# Patient Record
Sex: MALE | URBAN - METROPOLITAN AREA
[De-identification: names, ages, dates, MRNs, and addresses within clinical notes are randomized per-mention and may not be internally consistent; named-entity substitution may affect disease eponyms.]

---

## 2017-06-20 ENCOUNTER — APPOINTMENT (OUTPATIENT)
Dept: OTHER | Facility: HOSPITAL | Age: 67
End: 2017-06-20
Attending: EMERGENCY MEDICINE

## 2024-08-04 ENCOUNTER — HOSPITAL ENCOUNTER (INPATIENT)
Facility: HOSPITAL | Age: 74
LOS: 2 days | Discharge: HOME HEALTH | End: 2024-08-06
Attending: EMERGENCY MEDICINE | Admitting: EMERGENCY MEDICINE
Payer: MEDICARE

## 2024-08-04 ENCOUNTER — APPOINTMENT (OUTPATIENT)
Dept: GENERAL RADIOLOGY | Facility: HOSPITAL | Age: 74
End: 2024-08-04
Payer: MEDICARE

## 2024-08-04 DIAGNOSIS — I21.4 NSTEMI (NON-ST ELEVATED MYOCARDIAL INFARCTION) (HCC): Primary | ICD-10-CM

## 2024-08-04 LAB
ALBUMIN SERPL-MCNC: 4.5 G/DL (ref 3.2–4.8)
ALBUMIN/GLOB SERPL: 2.1 {RATIO} (ref 1–2)
ALP LIVER SERPL-CCNC: 56 U/L
ALT SERPL-CCNC: 17 U/L
ANION GAP SERPL CALC-SCNC: 5 MMOL/L (ref 0–18)
ANION GAP SERPL CALC-SCNC: 7 MMOL/L (ref 0–18)
APTT PPP: 26.8 SECONDS (ref 23–36)
AST SERPL-CCNC: 20 U/L (ref ?–34)
BASOPHILS # BLD AUTO: 0.06 X10(3) UL (ref 0–0.2)
BASOPHILS NFR BLD AUTO: 0.9 %
BILIRUB SERPL-MCNC: 0.7 MG/DL (ref 0.2–1.1)
BUN BLD-MCNC: 12 MG/DL (ref 9–23)
BUN BLD-MCNC: 12 MG/DL (ref 9–23)
BUN/CREAT SERPL: 12.2 (ref 10–20)
BUN/CREAT SERPL: 13.3 (ref 10–20)
CALCIUM BLD-MCNC: 9.7 MG/DL (ref 8.7–10.4)
CALCIUM BLD-MCNC: 9.8 MG/DL (ref 8.7–10.4)
CHLORIDE SERPL-SCNC: 108 MMOL/L (ref 98–112)
CHLORIDE SERPL-SCNC: 112 MMOL/L (ref 98–112)
CHOLEST SERPL-MCNC: 165 MG/DL (ref ?–200)
CO2 SERPL-SCNC: 23 MMOL/L (ref 21–32)
CO2 SERPL-SCNC: 26 MMOL/L (ref 21–32)
CREAT BLD-MCNC: 0.9 MG/DL
CREAT BLD-MCNC: 0.98 MG/DL
DEPRECATED RDW RBC AUTO: 43.2 FL (ref 35.1–46.3)
DEPRECATED RDW RBC AUTO: 44.4 FL (ref 35.1–46.3)
EGFRCR SERPLBLD CKD-EPI 2021: 80 ML/MIN/1.73M2 (ref 60–?)
EGFRCR SERPLBLD CKD-EPI 2021: 89 ML/MIN/1.73M2 (ref 60–?)
EOSINOPHIL # BLD AUTO: 0.13 X10(3) UL (ref 0–0.7)
EOSINOPHIL NFR BLD AUTO: 2 %
ERYTHROCYTE [DISTWIDTH] IN BLOOD BY AUTOMATED COUNT: 13.4 % (ref 11–15)
ERYTHROCYTE [DISTWIDTH] IN BLOOD BY AUTOMATED COUNT: 13.6 % (ref 11–15)
EST. AVERAGE GLUCOSE BLD GHB EST-MCNC: 120 MG/DL (ref 68–126)
GLOBULIN PLAS-MCNC: 2.1 G/DL (ref 2–3.5)
GLUCOSE BLD-MCNC: 145 MG/DL (ref 70–99)
GLUCOSE BLD-MCNC: 172 MG/DL (ref 70–99)
HBA1C MFR BLD: 5.8 % (ref ?–5.7)
HCT VFR BLD AUTO: 44 %
HCT VFR BLD AUTO: 44.8 %
HDLC SERPL-MCNC: 40 MG/DL (ref 40–59)
HGB BLD-MCNC: 15.6 G/DL
HGB BLD-MCNC: 16.1 G/DL
IMM GRANULOCYTES # BLD AUTO: 0.02 X10(3) UL (ref 0–1)
IMM GRANULOCYTES NFR BLD: 0.3 %
LDLC SERPL CALC-MCNC: 82 MG/DL (ref ?–100)
LYMPHOCYTES # BLD AUTO: 1.68 X10(3) UL (ref 1–4)
LYMPHOCYTES NFR BLD AUTO: 25.8 %
MCH RBC QN AUTO: 31.4 PG (ref 26–34)
MCH RBC QN AUTO: 31.9 PG (ref 26–34)
MCHC RBC AUTO-ENTMCNC: 34.8 G/DL (ref 31–37)
MCHC RBC AUTO-ENTMCNC: 36.6 G/DL (ref 31–37)
MCV RBC AUTO: 87.1 FL
MCV RBC AUTO: 90.1 FL
MONOCYTES # BLD AUTO: 0.49 X10(3) UL (ref 0.1–1)
MONOCYTES NFR BLD AUTO: 7.5 %
NEUTROPHILS # BLD AUTO: 4.14 X10 (3) UL (ref 1.5–7.7)
NEUTROPHILS # BLD AUTO: 4.14 X10(3) UL (ref 1.5–7.7)
NEUTROPHILS NFR BLD AUTO: 63.5 %
NONHDLC SERPL-MCNC: 125 MG/DL (ref ?–130)
OSMOLALITY SERPL CALC.SUM OF ELEC: 292 MOSM/KG (ref 275–295)
OSMOLALITY SERPL CALC.SUM OF ELEC: 296 MOSM/KG (ref 275–295)
PLATELET # BLD AUTO: 189 10(3)UL (ref 150–450)
PLATELET # BLD AUTO: 194 10(3)UL (ref 150–450)
POTASSIUM SERPL-SCNC: 4.1 MMOL/L (ref 3.5–5.1)
POTASSIUM SERPL-SCNC: 4.2 MMOL/L (ref 3.5–5.1)
PROT SERPL-MCNC: 6.6 G/DL (ref 5.7–8.2)
RBC # BLD AUTO: 4.97 X10(6)UL
RBC # BLD AUTO: 5.05 X10(6)UL
SODIUM SERPL-SCNC: 139 MMOL/L (ref 136–145)
SODIUM SERPL-SCNC: 142 MMOL/L (ref 136–145)
TRIGL SERPL-MCNC: 261 MG/DL (ref 30–149)
TROPONIN I SERPL HS-MCNC: 186 NG/L
TROPONIN I SERPL HS-MCNC: 70 NG/L
VLDLC SERPL CALC-MCNC: 42 MG/DL (ref 0–30)
WBC # BLD AUTO: 6.5 X10(3) UL (ref 4–11)
WBC # BLD AUTO: 6.8 X10(3) UL (ref 4–11)

## 2024-08-04 PROCEDURE — 93010 ELECTROCARDIOGRAM REPORT: CPT

## 2024-08-04 PROCEDURE — 84484 ASSAY OF TROPONIN QUANT: CPT | Performed by: EMERGENCY MEDICINE

## 2024-08-04 PROCEDURE — 99285 EMERGENCY DEPT VISIT HI MDM: CPT

## 2024-08-04 PROCEDURE — 85027 COMPLETE CBC AUTOMATED: CPT | Performed by: INTERNAL MEDICINE

## 2024-08-04 PROCEDURE — 93005 ELECTROCARDIOGRAM TRACING: CPT

## 2024-08-04 PROCEDURE — 80053 COMPREHEN METABOLIC PANEL: CPT

## 2024-08-04 PROCEDURE — 85730 THROMBOPLASTIN TIME PARTIAL: CPT | Performed by: INTERNAL MEDICINE

## 2024-08-04 PROCEDURE — 36415 COLL VENOUS BLD VENIPUNCTURE: CPT

## 2024-08-04 PROCEDURE — 71045 X-RAY EXAM CHEST 1 VIEW: CPT | Performed by: EMERGENCY MEDICINE

## 2024-08-04 PROCEDURE — 83036 HEMOGLOBIN GLYCOSYLATED A1C: CPT | Performed by: HOSPITALIST

## 2024-08-04 PROCEDURE — 80061 LIPID PANEL: CPT | Performed by: EMERGENCY MEDICINE

## 2024-08-04 PROCEDURE — 93010 ELECTROCARDIOGRAM REPORT: CPT | Performed by: INTERNAL MEDICINE

## 2024-08-04 PROCEDURE — 85025 COMPLETE CBC W/AUTO DIFF WBC: CPT | Performed by: EMERGENCY MEDICINE

## 2024-08-04 PROCEDURE — 80053 COMPREHEN METABOLIC PANEL: CPT | Performed by: EMERGENCY MEDICINE

## 2024-08-04 PROCEDURE — 94799 UNLISTED PULMONARY SVC/PX: CPT

## 2024-08-04 PROCEDURE — 85025 COMPLETE CBC W/AUTO DIFF WBC: CPT

## 2024-08-04 PROCEDURE — 84484 ASSAY OF TROPONIN QUANT: CPT

## 2024-08-04 RX ORDER — ACETAMINOPHEN 500 MG
500 TABLET ORAL EVERY 4 HOURS PRN
Status: DISCONTINUED | OUTPATIENT
Start: 2024-08-04 | End: 2024-08-06

## 2024-08-04 RX ORDER — HEPARIN SODIUM 1000 [USP'U]/ML
5000 INJECTION, SOLUTION INTRAVENOUS; SUBCUTANEOUS ONCE
Status: COMPLETED | OUTPATIENT
Start: 2024-08-04 | End: 2024-08-04

## 2024-08-04 RX ORDER — ONDANSETRON 2 MG/ML
4 INJECTION INTRAMUSCULAR; INTRAVENOUS EVERY 6 HOURS PRN
Status: DISCONTINUED | OUTPATIENT
Start: 2024-08-04 | End: 2024-08-06

## 2024-08-04 RX ORDER — METOCLOPRAMIDE HYDROCHLORIDE 5 MG/ML
10 INJECTION INTRAMUSCULAR; INTRAVENOUS EVERY 8 HOURS PRN
Status: DISCONTINUED | OUTPATIENT
Start: 2024-08-04 | End: 2024-08-06

## 2024-08-04 RX ORDER — HYDRALAZINE HYDROCHLORIDE 20 MG/ML
10 INJECTION INTRAMUSCULAR; INTRAVENOUS EVERY 6 HOURS PRN
Status: ACTIVE | OUTPATIENT
Start: 2024-08-04 | End: 2024-08-05

## 2024-08-04 RX ORDER — HEPARIN SODIUM AND DEXTROSE 10000; 5 [USP'U]/100ML; G/100ML
INJECTION INTRAVENOUS CONTINUOUS
Status: DISCONTINUED | OUTPATIENT
Start: 2024-08-05 | End: 2024-08-05

## 2024-08-04 RX ORDER — ASPIRIN 81 MG/1
81 TABLET, CHEWABLE ORAL DAILY
Status: DISCONTINUED | OUTPATIENT
Start: 2024-08-05 | End: 2024-08-06

## 2024-08-04 RX ORDER — HEPARIN SODIUM 5000 [USP'U]/ML
5000 INJECTION, SOLUTION INTRAVENOUS; SUBCUTANEOUS EVERY 8 HOURS SCHEDULED
Status: DISCONTINUED | OUTPATIENT
Start: 2024-08-04 | End: 2024-08-04 | Stop reason: ALTCHOICE

## 2024-08-04 RX ORDER — ATORVASTATIN CALCIUM 20 MG/1
20 TABLET, FILM COATED ORAL NIGHTLY
Status: DISCONTINUED | OUTPATIENT
Start: 2024-08-04 | End: 2024-08-06

## 2024-08-04 RX ORDER — ASPIRIN 81 MG/1
324 TABLET, CHEWABLE ORAL ONCE
Status: COMPLETED | OUTPATIENT
Start: 2024-08-04 | End: 2024-08-04

## 2024-08-04 RX ORDER — NITROGLYCERIN 0.4 MG/1
0.4 TABLET SUBLINGUAL ONCE
Status: DISCONTINUED | OUTPATIENT
Start: 2024-08-04 | End: 2024-08-06

## 2024-08-04 RX ORDER — BISACODYL 10 MG
10 SUPPOSITORY, RECTAL RECTAL
Status: DISCONTINUED | OUTPATIENT
Start: 2024-08-04 | End: 2024-08-06

## 2024-08-04 RX ORDER — HEPARIN SODIUM AND DEXTROSE 10000; 5 [USP'U]/100ML; G/100ML
1000 INJECTION INTRAVENOUS ONCE
Status: COMPLETED | OUTPATIENT
Start: 2024-08-04 | End: 2024-08-05

## 2024-08-04 RX ORDER — POLYETHYLENE GLYCOL 3350 17 G/17G
17 POWDER, FOR SOLUTION ORAL DAILY PRN
Status: DISCONTINUED | OUTPATIENT
Start: 2024-08-04 | End: 2024-08-06

## 2024-08-04 RX ORDER — SENNOSIDES 8.6 MG
17.2 TABLET ORAL NIGHTLY PRN
Status: DISCONTINUED | OUTPATIENT
Start: 2024-08-04 | End: 2024-08-06

## 2024-08-04 NOTE — ED INITIAL ASSESSMENT (HPI)
Pt to ED via triage c/o chest tightness that went across upper chest to left upper arm about 45 min ago. Pt states he was outside for about 10 min and as soon as he got back indoors, tightness began. Tightness has mostly subsided. Denies cardiac hx. + SOB initially, resolved

## 2024-08-04 NOTE — ED PROVIDER NOTES
Patient Seen in: [facility] Emergency Department      History     Chief Complaint   Patient presents with    Chest Pain     Stated Complaint: Chest Tightness    Subjective:   HPI    74-year-old male presents for evaluation for chest pain.  Patient reports that he was outside and developed chest tightness that radiated around his chest with shortness of breath that lasted for about 30 minutes.  He states that it was moderate in intensity.  Has never had symptoms like this.  He is currently asymptomatic.  He denies any nausea, vomiting, diaphoresis, syncope.  He denies any focal neurologic symptoms.  He denies any history of smoking.  There is no family history of ACS.    Objective:   History reviewed. No pertinent past medical history.           History reviewed. No pertinent surgical history.             Social History     Socioeconomic History    Marital status:    Tobacco Use    Smoking status: Never    Smokeless tobacco: Never   Substance and Sexual Activity    Alcohol use: Never    Drug use: Never              Review of Systems    Positive for stated Chief Complaint: Chest Pain    Other systems are as noted in HPI.  Constitutional and vital signs reviewed.      All other systems reviewed and negative except as noted above.    Physical Exam     ED Triage Vitals [08/04/24 1538]   /74   Pulse 76   Resp 18   Temp 97.7 °F (36.5 °C)   Temp src Oral   SpO2 100 %   O2 Device None (Room air)       Current Vitals:   Vital Signs  BP: 134/73  Pulse: 66  Resp: 17  Temp: 97.7 °F (36.5 °C)  Temp src: Oral  MAP (mmHg): 93    Oxygen Therapy  SpO2: 94 %  O2 Device: None (Room air)            Physical Exam  Vitals and nursing note reviewed.   Constitutional:       Appearance: Normal appearance.   HENT:      Head: Normocephalic and atraumatic.   Cardiovascular:      Rate and Rhythm: Normal rate and regular rhythm.      Pulses: Normal pulses.           Radial pulses are 2+ on the right side and 2+ on the left  side.      Heart sounds: Normal heart sounds. No murmur heard.  Pulmonary:      Effort: Pulmonary effort is normal.      Breath sounds: Normal breath sounds and air entry.   Abdominal:      General: Bowel sounds are normal. There is no abdominal bruit.      Palpations: Abdomen is soft.      Tenderness: There is no abdominal tenderness.   Musculoskeletal:         General: Normal range of motion.      Cervical back: Normal range of motion.      Right lower leg: No edema.      Left lower leg: No edema.   Skin:     General: Skin is warm and dry.   Neurological:      General: No focal deficit present.      Mental Status: He is alert.               ED Course     Labs Reviewed   COMP METABOLIC PANEL (14) - Abnormal; Notable for the following components:       Result Value    Glucose 145 (*)     Calculated Osmolality 296 (*)     A/G Ratio 2.1 (*)     All other components within normal limits   TROPONIN I HIGH SENSITIVITY - Abnormal; Notable for the following components:    Troponin I (High Sensitivity) 70 (*)     All other components within normal limits   LIPID PANEL - Abnormal; Notable for the following components:    Triglycerides 261 (*)     VLDL 42 (*)     All other components within normal limits   HEMOGLOBIN A1C - Abnormal; Notable for the following components:    HgbA1C 5.8 (*)     All other components within normal limits   CBC WITH DIFFERENTIAL WITH PLATELET    Narrative:     The following orders were created for panel order CBC With Differential With Platelet.  Procedure                               Abnormality         Status                     ---------                               -----------         ------                     CBC W/ DIFFERENTIAL[[id number]]                              Final result                 Please view results for these tests on the individual orders.   TROPONIN I HIGH SENSITIVITY   RAINBOW DRAW LAVENDER   RAINBOW DRAW LIGHT GREEN   RAINBOW DRAW GOLD   RAINBOW DRAW BLUE   CBC W/  DIFFERENTIAL     EKG    Rate, intervals and axes as noted on EKG Report.  Rate: 75  Rhythm: Sinus Rhythm  Reading: no stemi, interpreted by myself.     EKG    Rate, intervals and axes as noted on EKG Report.  Rate: 66  Rhythm: Sinus Rhythm  Reading: no stemi, interpreted by myself.                       MDM        Heart Score:    HEART Score      Title      Criteria Score   Age: 65 and older Age Score: 2   History: Mod Suspicious Hx Score: 1     EKG: Non-Spec Changes EKG Score: 1   HTN: No   Hypercholesterolemia: No   Atherosclerosis/PVD: No     DM: No   BMI>30kg/m2: No   Smoking: No   Family History: No         Other Risk Factor Score: 0             Lab Results   Component Value Date    TROPHS 70 (HH) 08/04/2024           HEART Score: 4        Risk of adverse cardiac event is 12-16.6%            Admission disposition: 8/4/2024  5:17 PM                                        Medical Decision Making  Differential diagnosis includes but is not limited to ACS, pneumonia, bronchospasm, costochondritis, etc.    Patient's EKG showed no ST elevations.  CBC and chemistry were unremarkable.  Patient's initial troponin did come back elevated concerning for ACS.  He was given aspirin.  Patient remained pain-free and symptom-free during his stay in the ED.  Cardiology is on consult.  He is admitted for further workup and evaluation.    Rhythm Strip: Rate 68 sinus The cardiac monitor was ordered secondary to the patient's chest pain.     Complicating factors: The patient  has no past medical history on file. and  has no past surgical history on file. that contribute to the medical complexity of this ED evaluation.     Medical Record Review: I personally reviewed available prior medical records for any recent pertinent discharge summaries, testing, and procedures, and reviewed those reports.        Problems Addressed:  NSTEMI (non-ST elevated myocardial infarction) (HCC): acute illness or injury with systemic symptoms that poses  a threat to life or bodily functions    Amount and/or Complexity of Data Reviewed  Labs: ordered. Decision-making details documented in ED Course.  Radiology: ordered and independent interpretation performed. Decision-making details documented in ED Course.  ECG/medicine tests: ordered and independent interpretation performed. Decision-making details documented in ED Course.  Discussion of management or test interpretation with external provider(s): Case discussed with Dr. Joy who accepts admission and would like cardiology consult.  Dr. Pate accepts cards consult.    Risk  Decision regarding hospitalization.    Critical Care  Total time providing critical care: minutes (45 minutes including time spent examining and re-evaluating the patient, ordering and reviewing laboratory tests, documenting, reviewing previous records, obtaining information from the family, and speaking with consultants, admitting doctors, nurses and medics and excludes any time spent on procedures.  )        Disposition and Plan     Clinical Impression:  1. NSTEMI (non-ST elevated myocardial infarction) (HCC)         Disposition:  Admit  8/4/2024  5:17 pm    Follow-up:  No follow-up provider specified.  We recommend that you schedule follow up care with a primary care provider within the next three months to obtain basic health screening including reassessment of your blood pressure.      Medications Prescribed:  There are no discharge medications for this patient.                        Hospital Problems       Present on Admission             ICD-10-CM Noted POA    * (Principal) NSTEMI (non-ST elevated myocardial infarction) (HCC) I21.4 8/4/2024 Unknown

## 2024-08-04 NOTE — PROGRESS NOTES
From home with wife  No medical hx or meds  Came in with CP and Trop elevated 70  CXR negative and EKG NSR  AxOx4, RA, NSR, Cont x2, Self    PLAN: NPO midnight, cards consult, trend trops

## 2024-08-04 NOTE — H&P
[facility]  part of [facility]     [facility] Hospitalist H&P     CC:   Chief Complaint   Patient presents with    Chest Pain        PCP: No primary care provider on file.      Assessment and Plan     Mariano Soto is a 74 year old male with no significant past medical history who presented with upper left chest pain that radiated to the left arm.  Started after he was outside for about 10 minutes and came back indoors and the tightness began.  Subsided by the time he got into the ER.  Troponin found to be mildly elevated at 70.  Will need to trend    NSTEMI?   Chest pain  -trop elevated to 70 approx 45 minutes after onset  -repeat pending  -cards consult, defer hep gtt decision to cards based on repeat trop  -NPO at [facility], defer to cards angio vs stress test  -aspirin    HTN  -bp elevated, pt denies hx, no home meds  -monitor trend, start med if continues to be elevated    LDL 82, HDL 40,   -start statin    FEN: IVF PRN, lytes in AM, npo midngiht    Ppx; scd, hsq    Dispo pending course, full code    Dw wife at [facility]       Outpatient records reviewed confirming patient's medical history and medications.     Further recommendations pending patient's clinical course.  [facility] [place] to continue to follow patient while in house    Patient and/or patient's family given opportunity to ask questions and note understanding and agreeing with therapeutic plan as outlined    Note: This chart was prepared using voice recognition software and may contain unintended word substitution errors.         Thank You,  Niyah Joy MD  [facility]    Answering Service number: [phone]    HPI     Mariano Soto is a 74 year old male with no significant past medical history who presented with upper left chest pain that radiated to the left arm.  Started after he was outside for about 10 minutes and came back indoors and the tightness began.  Subsided by the time he got into the ER.  Troponin found to be  mildly elevated at 70.  Will need to trend    Pt states pain lasted about 20 minutes., now resolved. No CP or SOB. Normally very active. No home meds. No fevers, cough,  NO N/V/D.      Review of Systems  12 point systems reviewed, please see HPI for pertinent positives, otherwise negative    History     PMH  History reviewed. No pertinent past medical history.     PSH  History reviewed. No pertinent surgical history.     ALL:  No Known Allergies     Home Medications:  No outpatient medications have been marked as taking for the 8/4/24 encounter (Hospital Encounter).       Soc Hx  Social History     Tobacco Use    Smoking status: Never    Smokeless tobacco: Never   Substance Use Topics    Alcohol use: Never        Fam Hx  No family history on file.        Objective     Temp: 97.7 °F (36.5 °C)  Pulse: 66  Resp: 17  BP: 134/73    Exam  Gen: No acute distress, alert and oriented x3  Neck Supple, no JVD  Pulm: Lungs clear, normal respiratory effort, No wheezing or crackles  CV: Heart with regular rate and rhythm, No murmurs, rubs, gallops  Abd: Abdomen soft, nontender, nondistended, no organomegaly, bowel sounds present  MSK:   no clubbing, no cyanosis.  No Lower extremity edema  Skin: no rashes or lesions, well perfused  Psych: mood stable, cooperative  Neuro: No focal deficits    Diagnostic Data:    CBC/Chem  Recent Labs   Lab 08/04/24  1610   WBC 6.5   HGB 15.6   MCV 90.1   .0       Recent Labs   Lab 08/04/24  1610      K 4.1      CO2 23.0   BUN 12   CREATSERUM 0.90   *   CA 9.8       Recent Labs   Lab 08/04/24  1610   ALT 17   AST 20   ALB 4.5       No results for input(s): \"TROP\" in the last 168 hours.    Additional Diagnostics: ECG:  no acute ST changes sinus rhythm, left axis deviation    Radiology: XR CHEST AP PORTABLE  (CPT=71045)    Result Date: 8/4/2024  PROCEDURE: XR CHEST AP PORTABLE  (CPT=71045) TIME: 16 30  COMPARISON: None.  INDICATIONS: Chest Tightness/ pain today.  TECHNIQUE:    Single view.   Findings and impression:  Normal heart size.  No edema.  Low lung volumes, lungs are clear.  Normal pleura Finalized by (CST): Chucky Ulloa MD on 8/04/2024 at 4:57 PM

## 2024-08-05 ENCOUNTER — APPOINTMENT (OUTPATIENT)
Dept: CV DIAGNOSTICS | Facility: HOSPITAL | Age: 74
End: 2024-08-05
Attending: INTERNAL MEDICINE
Payer: MEDICARE

## 2024-08-05 ENCOUNTER — APPOINTMENT (OUTPATIENT)
Dept: INTERVENTIONAL RADIOLOGY/VASCULAR | Facility: HOSPITAL | Age: 74
End: 2024-08-05
Attending: INTERNAL MEDICINE
Payer: MEDICARE

## 2024-08-05 LAB
ALBUMIN SERPL-MCNC: 4.3 G/DL (ref 3.2–4.8)
ALBUMIN/GLOB SERPL: 2 {RATIO} (ref 1–2)
ALP LIVER SERPL-CCNC: 52 U/L
ALT SERPL-CCNC: 17 U/L
ANION GAP SERPL CALC-SCNC: 6 MMOL/L (ref 0–18)
APTT PPP: 27.2 SECONDS (ref 23–36)
APTT PPP: 77.4 SECONDS (ref 23–36)
AST SERPL-CCNC: 19 U/L (ref ?–34)
ATRIAL RATE: 66 BPM
ATRIAL RATE: 75 BPM
ATRIAL RATE: 75 BPM
BASOPHILS # BLD AUTO: 0.06 X10(3) UL (ref 0–0.2)
BASOPHILS NFR BLD AUTO: 0.7 %
BILIRUB SERPL-MCNC: 1.1 MG/DL (ref 0.2–1.1)
BUN BLD-MCNC: 11 MG/DL (ref 9–23)
BUN/CREAT SERPL: 12.1 (ref 10–20)
CALCIUM BLD-MCNC: 9.4 MG/DL (ref 8.7–10.4)
CHLORIDE SERPL-SCNC: 109 MMOL/L (ref 98–112)
CO2 SERPL-SCNC: 24 MMOL/L (ref 21–32)
CREAT BLD-MCNC: 0.91 MG/DL
DEPRECATED RDW RBC AUTO: 43.8 FL (ref 35.1–46.3)
EGFRCR SERPLBLD CKD-EPI 2021: 88 ML/MIN/1.73M2 (ref 60–?)
EOSINOPHIL # BLD AUTO: 0.11 X10(3) UL (ref 0–0.7)
EOSINOPHIL NFR BLD AUTO: 1.3 %
ERYTHROCYTE [DISTWIDTH] IN BLOOD BY AUTOMATED COUNT: 13.5 % (ref 11–15)
GLOBULIN PLAS-MCNC: 2.2 G/DL (ref 2–3.5)
GLUCOSE BLD-MCNC: 119 MG/DL (ref 70–99)
HCT VFR BLD AUTO: 43.9 %
HGB BLD-MCNC: 15.9 G/DL
IMM GRANULOCYTES # BLD AUTO: 0.03 X10(3) UL (ref 0–1)
IMM GRANULOCYTES NFR BLD: 0.4 %
ISTAT ACTIVATED CLOTTING TIME: 244 SECONDS (ref 125–137)
ISTAT ACTIVATED CLOTTING TIME: 281 SECONDS (ref 125–137)
ISTAT ACTIVATED CLOTTING TIME: 366 SECONDS (ref 125–137)
ISTAT ACTIVATED CLOTTING TIME: 428 SECONDS (ref 125–137)
ISTAT ACTIVATED CLOTTING TIME: <125 SECONDS (ref 125–137)
LYMPHOCYTES # BLD AUTO: 1.78 X10(3) UL (ref 1–4)
LYMPHOCYTES NFR BLD AUTO: 21.8 %
MCH RBC QN AUTO: 31.9 PG (ref 26–34)
MCHC RBC AUTO-ENTMCNC: 36.2 G/DL (ref 31–37)
MCV RBC AUTO: 88 FL
MONOCYTES # BLD AUTO: 0.55 X10(3) UL (ref 0.1–1)
MONOCYTES NFR BLD AUTO: 6.7 %
NEUTROPHILS # BLD AUTO: 5.62 X10 (3) UL (ref 1.5–7.7)
NEUTROPHILS # BLD AUTO: 5.62 X10(3) UL (ref 1.5–7.7)
NEUTROPHILS NFR BLD AUTO: 69.1 %
OSMOLALITY SERPL CALC.SUM OF ELEC: 289 MOSM/KG (ref 275–295)
P AXIS: 10 DEGREES
P AXIS: 17 DEGREES
P AXIS: 21 DEGREES
P-R INTERVAL: 184 MS
P-R INTERVAL: 188 MS
P-R INTERVAL: 192 MS
PLATELET # BLD AUTO: 190 10(3)UL (ref 150–450)
PLATELET # BLD AUTO: 190 10(3)UL (ref 150–450)
POTASSIUM SERPL-SCNC: 4.4 MMOL/L (ref 3.5–5.1)
PROT SERPL-MCNC: 6.5 G/DL (ref 5.7–8.2)
Q-T INTERVAL: 404 MS
Q-T INTERVAL: 416 MS
Q-T INTERVAL: 438 MS
QRS DURATION: 100 MS
QRS DURATION: 96 MS
QRS DURATION: 98 MS
QTC CALCULATION (BEZET): 451 MS
QTC CALCULATION (BEZET): 459 MS
QTC CALCULATION (BEZET): 464 MS
R AXIS: -30 DEGREES
R AXIS: -34 DEGREES
R AXIS: -40 DEGREES
RBC # BLD AUTO: 4.99 X10(6)UL
SODIUM SERPL-SCNC: 139 MMOL/L (ref 136–145)
T AXIS: 32 DEGREES
T AXIS: 34 DEGREES
T AXIS: 47 DEGREES
TROPONIN I SERPL HS-MCNC: 337 NG/L
VENTRICULAR RATE: 66 BPM
VENTRICULAR RATE: 75 BPM
VENTRICULAR RATE: 75 BPM
WBC # BLD AUTO: 8.2 X10(3) UL (ref 4–11)

## 2024-08-05 PROCEDURE — 80053 COMPREHEN METABOLIC PANEL: CPT | Performed by: HOSPITALIST

## 2024-08-05 PROCEDURE — 027034Z DILATION OF CORONARY ARTERY, ONE ARTERY WITH DRUG-ELUTING INTRALUMINAL DEVICE, PERCUTANEOUS APPROACH: ICD-10-PCS | Performed by: INTERNAL MEDICINE

## 2024-08-05 PROCEDURE — 93306 TTE W/DOPPLER COMPLETE: CPT | Performed by: INTERNAL MEDICINE

## 2024-08-05 PROCEDURE — 85730 THROMBOPLASTIN TIME PARTIAL: CPT | Performed by: INTERNAL MEDICINE

## 2024-08-05 PROCEDURE — 85347 COAGULATION TIME ACTIVATED: CPT

## 2024-08-05 PROCEDURE — 85049 AUTOMATED PLATELET COUNT: CPT | Performed by: INTERNAL MEDICINE

## 2024-08-05 PROCEDURE — 85025 COMPLETE CBC W/AUTO DIFF WBC: CPT | Performed by: HOSPITALIST

## 2024-08-05 PROCEDURE — 99153 MOD SED SAME PHYS/QHP EA: CPT | Performed by: INTERNAL MEDICINE

## 2024-08-05 PROCEDURE — 93458 L HRT ARTERY/VENTRICLE ANGIO: CPT | Performed by: INTERNAL MEDICINE

## 2024-08-05 PROCEDURE — 4A023N7 MEASUREMENT OF CARDIAC SAMPLING AND PRESSURE, LEFT HEART, PERCUTANEOUS APPROACH: ICD-10-PCS | Performed by: INTERNAL MEDICINE

## 2024-08-05 PROCEDURE — 84484 ASSAY OF TROPONIN QUANT: CPT | Performed by: INTERNAL MEDICINE

## 2024-08-05 PROCEDURE — 99152 MOD SED SAME PHYS/QHP 5/>YRS: CPT | Performed by: INTERNAL MEDICINE

## 2024-08-05 PROCEDURE — B2111ZZ FLUOROSCOPY OF MULTIPLE CORONARY ARTERIES USING LOW OSMOLAR CONTRAST: ICD-10-PCS | Performed by: INTERNAL MEDICINE

## 2024-08-05 RX ORDER — SODIUM CHLORIDE 9 MG/ML
INJECTION, SOLUTION INTRAVENOUS CONTINUOUS
Status: ACTIVE | OUTPATIENT
Start: 2024-08-05 | End: 2024-08-05

## 2024-08-05 RX ORDER — MIDAZOLAM HYDROCHLORIDE 1 MG/ML
INJECTION INTRAMUSCULAR; INTRAVENOUS
Status: COMPLETED
Start: 2024-08-05 | End: 2024-08-05

## 2024-08-05 RX ORDER — HEPARIN SODIUM 1000 [USP'U]/ML
30 INJECTION, SOLUTION INTRAVENOUS; SUBCUTANEOUS ONCE
Status: COMPLETED | OUTPATIENT
Start: 2024-08-05 | End: 2024-08-05

## 2024-08-05 RX ORDER — LIDOCAINE HYDROCHLORIDE 20 MG/ML
INJECTION, SOLUTION EPIDURAL; INFILTRATION; INTRACAUDAL; PERINEURAL
Status: COMPLETED
Start: 2024-08-05 | End: 2024-08-05

## 2024-08-05 RX ORDER — CHLORHEXIDINE GLUCONATE 40 MG/ML
SOLUTION TOPICAL
Status: DISCONTINUED | OUTPATIENT
Start: 2024-08-06 | End: 2024-08-05

## 2024-08-05 RX ORDER — SODIUM CHLORIDE 9 MG/ML
INJECTION, SOLUTION INTRAVENOUS
Status: DISCONTINUED | OUTPATIENT
Start: 2024-08-06 | End: 2024-08-05

## 2024-08-05 RX ORDER — VERAPAMIL HYDROCHLORIDE 2.5 MG/ML
INJECTION, SOLUTION INTRAVENOUS
Status: COMPLETED
Start: 2024-08-05 | End: 2024-08-05

## 2024-08-05 RX ORDER — LIDOCAINE HYDROCHLORIDE 20 MG/ML
INJECTION, SOLUTION INFILTRATION; PERINEURAL
Status: COMPLETED
Start: 2024-08-05 | End: 2024-08-05

## 2024-08-05 RX ORDER — ASPIRIN 81 MG/1
324 TABLET, CHEWABLE ORAL ONCE
Status: COMPLETED | OUTPATIENT
Start: 2024-08-05 | End: 2024-08-05

## 2024-08-05 RX ORDER — NITROGLYCERIN 20 MG/100ML
INJECTION INTRAVENOUS
Status: COMPLETED
Start: 2024-08-05 | End: 2024-08-05

## 2024-08-05 RX ORDER — HEPARIN SODIUM 1000 [USP'U]/ML
INJECTION, SOLUTION INTRAVENOUS; SUBCUTANEOUS
Status: COMPLETED
Start: 2024-08-05 | End: 2024-08-05

## 2024-08-05 NOTE — CONSULTS
[facility] CARDIOLOGY CONSULT      Mariano Soto is a 74 year old male who I assessed as follows:  Chief Complaint   Patient presents with    Chest Pain          REASON FOR CONSULTATION:    chest pain            ASSESSMENT/PLAN:     IMPRESSIONS:  Acute NSTEMI  hyperglycemia        PLAN:  ECGs reviewed  IV heparin  ASA, BB, statin  Discussed risks/benefits/lalternatives to left heart cath with possible stent for later today and he agrees to proceed.              --------------------------------------------------------------------------------------------------------------------------------    HPI:         Was outside yesterday afternoon and developed left chest pain across chest in a band. Still persisted after 1/2 hour so he came to ER. Resolved prior to ER. Mild dyspnea. Not related to exertion. Not pleuritic. Troponins elevated. No prior heart issues. Elevated glucose.             No current outpatient medications on file.      History reviewed. No pertinent past medical history.  History reviewed. No pertinent surgical history.  No family history on file.   Social History:  Social History     Socioeconomic History    Marital status:    Tobacco Use    Smoking status: Never    Smokeless tobacco: Never   Substance and Sexual Activity    Alcohol use: Never    Drug use: Never     Social Determinants of Health     Food Insecurity: Unknown (8/4/2024)    Food Insecurity     Food Insecurity: Patient declined   Transportation Needs: Unknown (8/4/2024)    Transportation Needs     Lack of Transportation: Patient declined   Housing Stability: Unknown (8/4/2024)    Housing Stability     Housing Instability: Patient declined          Medications:  Current Facility-Administered Medications   Medication Dose Route Frequency    metoprolol tartrate (Lopressor) partial tab 12.5 mg  12.5 mg Oral 2x Daily(Beta Blocker)    acetaminophen (Tylenol Extra Strength) tab 500 mg  500 mg Oral Q4H PRN    polyethylene glycol (PEG  3350) (Miralax) 17 g oral packet 17 g  17 g Oral Daily PRN    sennosides (Senokot) tab 17.2 mg  17.2 mg Oral Nightly PRN    bisacodyl (Dulcolax) 10 MG rectal suppository 10 mg  10 mg Rectal Daily PRN    ondansetron (Zofran) 4 MG/2ML injection 4 mg  4 mg Intravenous Q6H PRN    metoclopramide (Reglan) 5 mg/mL injection 10 mg  10 mg Intravenous Q8H PRN    hydrALAzine (Apresoline) 20 mg/mL injection 10 mg  10 mg Intravenous Q6H PRN    atorvastatin (Lipitor) tab 20 mg  20 mg Oral Nightly    aspirin chewable tab 81 mg  81 mg Oral Daily    heparin (Porcine) 24691 units/250mL infusion ACS/AFIB CONTINUOUS  200-3,000 Units/hr Intravenous Continuous    nitroglycerin (Nitrostat) SL tab 0.4 mg  0.4 mg Sublingual Once           Allergies  No Known Allergies            REVIEW OF SYSTEMS:   GENERAL HEALTH: no fevers  SKIN: denies any unusual skin lesions or rashes   EARS: no recent changes in hearing  EYE: no recent vision changes  THROAT: denies sore throat  RESPIRATORY: denies cough or shortness of breath with exertion  CARDIOVASCULAR: chest pain  GI: denies abdominal pain, nausea and vomiting  NEURO: denies headaches and focal neurologic symptoms  PSYCH: no recent depression  ENDOCRINE: no known DM  HEME: no easy bruising  All other systems reviewed and negative.          EXAM:          TELE:           /72 (BP Location: Right arm)   Pulse 67   Temp 98.4 °F (36.9 °C) (Oral)   Resp 16   Ht 5' 10\" (1.778 m)   Wt 211 lb 4.8 oz (95.8 kg)   SpO2 94%   BMI 30.32 kg/m²   Temp (24hrs), Av.1 °F (36.7 °C), Min:97.7 °F (36.5 °C), Max:98.6 °F (37 °C)    Wt Readings from Last 3 Encounters:   24 211 lb 4.8 oz (95.8 kg)         Intake/Output Summary (Last 24 hours) at 2024 0738  Last data filed at 2024 0021  Gross per 24 hour   Intake 730 ml   Output --   Net 730 ml         GENERAL: well developed, well nourished, in no apparent distress  SKIN: no rashes  HEENT: atraumatic, normocephalic, throat without  erythema  NECK: supple, no bruits  LUNGS: clear to auscultation  CARDIO: RRR without murmur or S3   GI: soft, nontender  EXTREMITIES: no cyanosis, clubbing or edema  NEUROLOGY: alert  PSYCH: cooperative          LABORATORY DATA:               ECG: SR, LAD        ECHO:          Labs:  Recent Labs   Lab 08/04/24  1610 08/04/24  1943   * 172*   BUN 12 12   CREATSERUM 0.90 0.98   CA 9.8 9.7    139   K 4.1 4.2    108   CO2 23.0 26.0     No results for input(s): \"TROP\" in the last 168 hours.  Recent Labs   Lab 08/05/24  0651   RBC 4.99   HGB 15.9   HCT 43.9   MCV 88.0   MCH 31.9   MCHC 36.2   RDW 13.5   NEPRELIM 5.62   WBC 8.2   .0  190.0     Recent Labs   Lab 08/04/24  1610 08/04/24  1808   TROPHS 70* 186*       Imaging:  No results found.       CXR:  Findings and impression:  Normal heart size.  No edema.  Low lung volumes, lungs are clear.  Normal pleura             Jesus Castillo MD

## 2024-08-05 NOTE — PROGRESS NOTES
[facility]  part of [facility]     DM Hospitalist Progress Note     PCP: No primary care provider on file.    CC: Follow up       Assessment/Plan:   Mariano Soto is a 74 year old male with no significant past medical history who presented with upper left chest pain that radiated to the left arm.  Started after he was outside for about 10 minutes and came back indoors and the tightness began.  Subsided by the time he got into the ER.  Troponin found to be mildly elevated at 70.  Will need to trend     NSTEMI  Chest pain  -trop elevated to 70 approx 45 minutes after onset->300  -hep gtt. Cards consult. Angio  -follow up results  -cont statin, aspirin     HTN  -bp elevated, pt denies hx, no home meds  -monitor trend, start med if continues to be elevated     LDL 82, HDL 40,   -start statin     FEN: IVF PRN, lytes in AM, npo midngiht     Ppx; scd, hsq     Dispo pending course, full code    Questions/concerns were discussed with patient and/or family by bedside.    Note: This chart was prepared using voice recognition software and may contain unintended word substitution errors.       Thank You,  Niyah Joy M.D.  [facility] Hospitalist  Answering Service: [phone]        Subjective     No issues overnight, cath later today  No CP, SOB, or N/V.      Objective     OBJECTIVE:  Temp:  [97.7 °F (36.5 °C)-98.6 °F (37 °C)] 97.9 °F (36.6 °C)  Pulse:  [63-79] 76  Resp:  [16-25] 18  BP: (127-157)/(69-85) 144/78  SpO2:  [94 %-100 %] 94 %    Intake/Output:    Intake/Output Summary (Last 24 hours) at 8/5/2024 1417  Last data filed at 8/5/2024 1228  Gross per 24 hour   Intake 730 ml   Output --   Net 730 ml       Last 3 Weights   08/05/24 0402 211 lb 4.8 oz (95.8 kg)   08/04/24 1843 215 lb 3.2 oz (97.6 kg)   08/04/24 1538 210 lb (95.3 kg)       Exam  Gen: No acute distress, alert and oriented x3  Neck Supple, no JVD  Pulm: Lungs clear, normal respiratory effort, No wheezing or crackles  CV: Heart  with regular rate and rhythm, No murmurs, rubs, gallops  Abd: Abdomen soft, nontender, nondistended, no organomegaly, bowel sounds present  MSK:  no clubbing, no cyanosis.  No Lower extremity edema  Skin: no rashes or lesions, well perfused  Psych: mood stable, cooperative  Neuro: no focal deficits    Medications      [START ON 2024] chlorhexidine   Topical On Call    [START ON 2024] sodium chloride   Intravenous On Call    metoprolol tartrate  12.5 mg Oral 2x Daily(Beta Blocker)    atorvastatin  20 mg Oral Nightly    aspirin  81 mg Oral Daily    nitroglycerin  0.4 mg Sublingual Once      continuous dose heparin 1,000 Units/hr (24 09)       acetaminophen    polyethylene glycol (PEG 3350)    sennosides    bisacodyl    ondansetron    metoclopramide    hydrALAzine    Data Review:       Labs:     Recent Labs   Lab 24  0651   WBC 6.5 6.8 8.2   HGB 15.6 16.1 15.9   MCV 90.1 87.1 88.0   .0 194.0 190.0  190.0       Recent Labs   Lab 24  0651    139 139   K 4.1 4.2 4.4    108 109   CO2 23.0 26.0 24.0   BUN 12 12 11   CREATSERUM 0.90 0.98 0.91   CA 9.8 9.7 9.4   * 172* 119*       Recent Labs   Lab 24  0651   ALT 17 17   AST 20 19   ALB 4.5 4.3       No results for input(s): \"PGLU\" in the last 168 hours.    No results for input(s): \"TROP\" in the last 168 hours.    Imaging:  CARD ECHO 2D DOPPLER (CPT=93306)    Result Date: 2024  Transthoracic Echocardiogram Name:Mariano Soto Date: 2024 :  1950 Ht:  (70in)  BP: 144 / 78 MRN:  [id number]     Age:  74years    Wt:  (211lb) HR: 62bpm Loc:  [facility]       Gndr: M          BSA: 2.14m^2 Sonographer: Yaquelin RIOJAS Ordering:    Jesus Castillo Consulting:  Reece Pate ---------------------------------------------------------------------------- History/Indications:   NSTEMI.  ---------------------------------------------------------------------------- Procedure information:  A transthoracic complete 2D study was performed. Additional evaluation included M-mode, complete spectral Doppler, and color Doppler.  Patient status:  Inpatient.  Location:  Bedside.    This was a routine study. Transthoracic echocardiography for diagnosis. Image quality was adequate. The study was technically limited due to poor acoustic window availability. ECG rhythm:   Normal sinus ---------------------------------------------------------------------------- Conclusions: 1. Left ventricle: The cavity size was normal. Wall thickness was mildly    increased. Systolic function was at the lower limits of normal. The    estimated ejection fraction was 50-55%, by biplane method of disks.    Possible mild hypokinesis of the anterior wall. Left ventricular    diastolic function is indeterminate. 2. Aortic valve: There was mild to moderate regurgitation. 3. Systemic arteries: The aortic root diameter is 4.2cm. 4. Aortic root: The aortic root was dilated. * ---------------------------------------------------------------------------- * Findings: Left ventricle:  The cavity size was normal. Wall thickness was mildly increased. Systolic function was at the lower limits of normal. The estimated ejection fraction was 50-55%, by biplane method of disks. Regional wall motion abnormalities:   Possible mild hypokinesis of the anterior wall. Left ventricular diastolic function is indeterminate. Left atrium:  Well visualized. The atrium was normal in size. Right ventricle:  The cavity size was normal. Systolic function was normal. Right atrium:  Well visualized. The atrium was normal in size. Mitral valve:  Well visualized. The valve was structurally normal. The leaflets were normal thickness. Leaflet separation was normal. No evidence for prolapse.  Doppler:  Transvalvular velocity was within the normal range. There was no evidence  for stenosis. There was trace regurgitation. Aortic valve:  Well visualized. The valve was structurally normal. The valve was trileaflet. The leaflets were normal thickness. Cusp separation was normal.  Doppler:  Transvalvular velocity was within the normal range. There was no evidence for stenosis. There was mild to moderate regurgitation. The peak systolic gradient was 5mm Hg. Tricuspid valve:  Well visualized. The annulus is normal-sized. The valve is structurally normal. The leaflets are normal thickness. Leaflet separation was normal. No echocardiographic evidence for tricuspid prolapse.  Doppler: Transvalvular velocity was within the normal range. There was no evidence for stenosis. There was no significant regurgitation. Pulmonic valve:   Well visualized. The annulus is normal-sized. The valve is structurally normal. The leaflets are normal thickness. Cusp separation was normal. No evidence for prolapse.  Doppler:  Transvalvular velocity was within the normal range. There was no evidence for stenosis. There was mild regurgitation. Pericardium:   There was no pericardial effusion. Pleura:  No evidence of pleural fluid accumulation. Aorta: Aortic root: The aortic root was dilated. Ascending aorta: The ascending aorta was normal. The ascending aorta was normal. Pulmonary arteries: The main pulmonary artery was normal-sized.  Systolic pressure could not be accurately estimated. Systemic veins: Inferior vena cava: The IVC was normally collapsible and normal-sized. ---------------------------------------------------------------------------- Measurements  Left ventricle                    Value        Ref  IVS thickness, ED, PLAX       (H) 1.3   cm     0.6 - 1.0  LV ID, ED, PLAX                   5.0   cm     4.2 - 5.8  LV ID, ES, PLAX                   3.6   cm     2.5 - 4.0  LV PW thickness, ED, PLAX     (H) 1.1   cm     0.6 - 1.0  IVS/LV PW ratio, ED, PLAX         1.18         ---------  LV PW/LV ID ratio, ED,  PLAX       0.22         ---------  LV ejection fraction              54    %      52 - 72  Stroke volume/bsa, 2D             32    ml/m^2 ---------  LV end-diastolic volume, 1-p      111   ml     69 - 185  A4C  LV ejection fraction, 1-p A4C     57    %      46 - 74  Stroke volume, 1-p A4C            63    ml     ---------  LV end-diastolic volume/bsa,      52    ml/m^2 37 - 93  1-p A4C  Stroke volume/bsa, 1-p A4C        30    ml/m^2 ---------  LV end-diastolic volume, 2-p      110   ml     62 - 150  LV end-systolic volume, 2-p       47    ml     21 - 61  LV ejection fraction, 2-p         57    %      52 - 72  Stroke volume, 2-p                63    ml     ---------  LV end-diastolic volume/bsa,      52    ml/m^2 34 - 74  2-p  LV end-systolic volume/bsa,       22    ml/m^2 11 - 31  2-p  Stroke volume/bsa, 2-p            29.5  ml/m^2 ---------  LV e', lateral                (L) 6.9   cm/sec >=10.0  LV E/e', lateral                  7            <=13  LV e', medial                     7.3   cm/sec >=7.0  LV E/e', medial                   7            ---------  LV e', average                    7.1   cm/sec ---------  LV E/e', average                  7            <=14  LVOT                              Value        Ref  LVOT ID                           2     cm     ---------  LVOT peak velocity, S             1.02  m/sec  ---------  LVOT VTI, S                       21.7  cm     ---------  LVOT peak gradient, S             4     mm Hg  ---------  LVOT mean gradient, S             2     mm Hg  ---------  Stroke volume (SV), LVOT DP       68    ml     ---------  Stroke index (SV/bsa), LVOT       32    ml/m^2 ---------  DP  Aortic valve                      Value        Ref  Aortic leaflet separation, MM     1.9   cm     ---------  Aortic valve peak velocity, S     1.16  m/sec  ---------  Aortic peak gradient, S           5     mm Hg  ---------  Aortic pressure half-time         533   ms     ---------  Velocity ratio,  peak, LVOT/AV     0.88         ---------  Aortic regurg velocity, ED        3.32  m/sec  ---------  Aortic regurg deceleration        174   cm/s^2 ---------  Aortic regurg pressure            557   ms     ---------  half-time  Aortic regurg gradient, ED        44    mm Hg  ---------  Aortic root                       Value        Ref  Aortic root ID                    4.2   cm     2.8 - 4.2  Aortic root ID, STJ, ED           3.4   cm     2.3 - 3.5  Ascending aorta                   Value        Ref  Ascending aorta ID                3.4   cm     2.2 - 3.8  Left atrium                       Value        Ref  LA volume, S                      45    ml     18 - 58  LA volume/bsa, S                  21    ml/m^2 16 - 34  LA volume, ES, 1-p A4C            44    ml     18 - 58  LA volume, ES, 1-p A2C            38    ml     18 - 58  LA volume, ES, A/L                47    ml     ---------  LA volume/bsa, ES, A/L            22    ml/m^2 16 - 34  Mitral valve                      Value        Ref  Mitral E-wave peak velocity       0.51  m/sec  ---------  Mitral A-wave peak velocity       0.62  m/sec  ---------  Mitral deceleration time          222   ms     ---------  Mitral E/A ratio, peak            0.8          ---------  Pulmonary artery                  Value        Ref  PA pressure, ED, DP               7     mm Hg  ---------  Systemic veins                    Value        Ref  Estimated CVP                     3     mm Hg  ---------  Inferior vena cava                Value        Ref  ID                                1.7   cm     <=2.1  Right ventricle                   Value        Ref  TAPSE, 2D                         2.30  cm     >=1.70  TAPSE, MM                         2.30  cm     >=1.70  RV s', lateral                    14.9  cm/sec >=9.5  Pulmonic valve                    Value        Ref  Pulmonic regurg peak velocity     1.67  m/sec  ---------  Pulmonic regurg peak gradient     11    mm Hg  ---------   Pulmonic regurg velocity, ED      1     m/sec  --------- Legend: (L)  and  (H)  elsie values outside specified reference range. ---------------------------------------------------------------------------- Prepared and electronically signed by Jesus Castillo 08/05/2024 11:26     XR CHEST AP PORTABLE  (CPT=71045)    Result Date: 8/4/2024  PROCEDURE: XR CHEST AP PORTABLE  (CPT=71045) TIME: 16 30  COMPARISON: None.  INDICATIONS: Chest Tightness/ pain today.  TECHNIQUE:   Single view.   Findings and impression:  Normal heart size.  No edema.  Low lung volumes, lungs are clear.  Normal pleura Finalized by (CST): Chucky Ulloa MD on 8/04/2024 at 4:57 PM

## 2024-08-05 NOTE — PROCEDURES
[facility]  Cardiac Catheterization Report    (S): Milton Barrios MD    PREOPERATIVE DIAGNOSIS: NSTEMI    POSTOPERATIVE DIAGNOSIS: NSTEMI    PROCEDURE PERFORMED: Left heart catheterization with selective coronary arteriography     CONSENT: The risks, benefits, and alternatives of cardiac catheterization were discussed with the patient.  The risks included, but were not limited to: bleeding, limb ischemia, deep venous thrombosis, allergic reaction, infection, stroke, myocardial infarction,  and death.  Benefits of the procedure included: symptomatic improvement, diagnosis of heart disease and prevention of myocardial infarction.  Alternatives to the procedure included: not performing cardiac catheterization, treatment with medications only, and observation. The patient verbalized understanding and agreed to proceed with the procedure.     CATHETERS: 6F JL 3.5, JR4; 6F EBU 4    VASCULAR CLOSURE: TR Band    SEDATION: 4 mg Versed, 175 mcg Fentanyl     DESCRIPTION OF PROCEDURE:  The patient was brought to the cardiac catheterization laboratory.      Once local anesthesia was achieved, sedation was administered. The IV was maintained by the RN and moderate conscious sedation with Versed and Fentanyl was given. The patient was assessed and monitoring of oxygen, heart rate and blood pressure by nurse and myself during the exam 1456 (time of 1st dose administered when I was present) to 1724 (procedure end time).     The right radial area was prepped and draped in a sterile manner and anesthetized with 2% lidocaine.  The right radial artery was accessed, and a 6-Austrian Glidesheath was placed under modified seldinger technique.  Left and right selective coronary angiography was performed using the above catheters.  A JR4 catheter was used to cross the aortic valve, measure left ventricular pressure.  At the conclusion of the study, the right radial artery hemostasis was performed using a radial  band.      Femoral access  Once adequate sedation was achieved, a micropuncture sheath was inserted into the right common femoral artery using a modified Seldinger technique under ultrasound guidance.  Its position was confirmed by fluoroscopy.  This was then exchanged for a 6-Bahamian sheath.  Angiography was performed using the aforementioned catheters.  The coronary ostia were noted to have normal origins from the aorta.  A left ventriculogram was performed in the MACK position.  A right common femoral arteriogram was performed via an injection of contrast through the sheath.  The sheath was removed. Hemostasis was achieved.  No complications were noted at the end of the procedure.    PCI:   Therapeutic heparin was given to maintain ACT greater than 250 seconds.  We initially had a right radial approach.  The left coronary artery was engaged with a EBU 3.5 guide.  We attempted to wire the circumflex into the obtuse marginal with a BMW with Corsair.  Unfortunately, after being able to wire the OM, we were unable to advance the Corsair past the initial turn of the ostial circumflex.  Due to this, radial approach was aborted, switched to femoral approach.    Femoral access was obtained as above.  A 6 Bahamian EBU 4 guide was then used to engage the left coronary artery.  The circumflex into the obtuse marginal was dual wired with a Mykel Blue coronary wire and a BMW coronary wire for extra-support.  The lesion was ballooned with a 1.2 mm balloon and then with a 2.5 mm compliant balloon.  A 2.25 x 12 mm Synergy XD coronary stent was attempted to be delivered; however, he was unable to advance past the second turn of the circumflex.  There is Mykel Blue wire was removed, and a 6 Bahamian guide liner was then advanced.  With the help of a guide liner, the stent was able to be advanced to the lesion and deployed.  A 2.5 mm noncompliant balloon was then used to post dilate the stent. Final angiogram with SANGITA III flow and without  perforation or dissection.     FINDINGS:         ANGIOGRAPHY:      Left main: Large vessel that bifurcates into the LAD and circumflex coronary arteries.  Angiographically normal.    LAD: Large vessel that wraps the apex and gives off a large diagonal branch.  There is diffuse mild disease.  There is 70% ostial to proximal tubular lesion of the first diagonal branch.    Left circumflex: Large, tortuous artery with an acute 90 degree turn at the ostium and gives off a large OM1 branch. There is a discrete 99% lesion at the bifurcation of the obtuse marginal.    RCA: Large-caliber vessel that gives off the RPDA.  There is a mild discrete lesion of the mid RCA.    CONCLUSIONS:  1.  Successful PCI of the OM1 with a Synergy XD 2.25 x 12 mm LACIE postdilated to 2.5 mm with conversion of the stenosis from 99% to 0%.  SANGITA-3 flow pre and postintervention.    RECOMMENDATIONS:  DAPT with aspirin and ticagrelor for 12 months for ACS indication, and then aspirin 81 mg indefinitely thereafter.  IV hydration for 4 hours.  Can discontinue heparin.  Bedrest for 2 hours.    MD Chinmay Taylor  8/5/2024  5:29 PM

## 2024-08-05 NOTE — PLAN OF CARE
Aox4. Room air. Self. Heparin gtt. Trend troponins.     Problem: CARDIOVASCULAR - ADULT  Goal: Maintains optimal cardiac output and hemodynamic stability  Description: INTERVENTIONS:  - Monitor vital signs, rhythm, and trends  - Monitor for bleeding, hypotension and signs of decreased cardiac output  - Evaluate effectiveness of vasoactive medications to optimize hemodynamic stability  - Monitor arterial and/or venous puncture sites for bleeding and/or hematoma  - Assess quality of pulses, skin color and temperature  - Assess for signs of decreased coronary artery perfusion - ex. Angina  - Evaluate fluid balance, assess for edema, trend weights  Outcome: Progressing     Problem: HEMATOLOGIC - ADULT  Goal: Maintains hematologic stability  Description: INTERVENTIONS  - Assess for signs and symptoms of bleeding or hemorrhage  - Monitor labs and vital signs for trends  - Administer supportive blood products/factors, fluids and medications as ordered and appropriate  - Administer supportive blood products/factors as ordered and appropriate  Outcome: Progressing

## 2024-08-05 NOTE — PLAN OF CARE
AxOx4, RA, NSR, Cont x2, Self  PLAN: C today, heparin drip   Problem: Patient Centered Care  Goal: Patient preferences are identified and integrated in the patient's plan of care  Description: Interventions:  - What would you like us to know as we care for you? Home with wife  - Provide timely, complete, and accurate information to patient/family  - Incorporate patient and family knowledge, values, beliefs, and cultural backgrounds into the planning and delivery of care  - Encourage patient/family to participate in care and decision-making at the level they choose  - Honor patient and family perspectives and choices  8/5/2024 1105 by Sharita Bennett RN  Outcome: Progressing  8/5/2024 1104 by Sharita Bennett RN  Outcome: Progressing  8/5/2024 1104 by Sharita Bennett RN  Outcome: Progressing     Problem: Patient/Family Goals  Goal: Patient/Family Long Term Goal  Description: Patient's Long Term Goal: go home    Interventions:  - go home  - See additional Care Plan goals for specific interventions  8/5/2024 1105 by Sharita Bennett RN  Outcome: Progressing  8/5/2024 1104 by Sharita Bennett RN  Outcome: Progressing  8/5/2024 1104 by Sharita Bennett RN  Outcome: Progressing  Goal: Patient/Family Short Term Goal  Description: Patient's Short Term Goal: no chest pain    Interventions:   - aspirin  - See additional Care Plan goals for specific interventions  8/5/2024 1105 by Sharita Bennett RN  Outcome: Progressing  8/5/2024 1104 by Sharita Bennett RN  Outcome: Progressing  8/5/2024 1104 by Sharita Bennett RN  Outcome: Progressing     Problem: CARDIOVASCULAR - ADULT  Goal: Maintains optimal cardiac output and hemodynamic stability  Description: INTERVENTIONS:  - Monitor vital signs, rhythm, and trends  - Monitor for bleeding, hypotension and signs of decreased cardiac output  - Evaluate effectiveness of vasoactive medications to optimize hemodynamic stability  - Monitor arterial and/or venous puncture sites for bleeding and/or  hematoma  - Assess quality of pulses, skin color and temperature  - Assess for signs of decreased coronary artery perfusion - ex. Angina  - Evaluate fluid balance, assess for edema, trend weights  8/5/2024 1105 by Sharita Bennett RN  Outcome: Progressing  8/5/2024 1104 by Sharita Bennett RN  Outcome: Progressing  8/5/2024 1104 by Sharita Bennett RN  Outcome: Progressing     Problem: HEMATOLOGIC - ADULT  Goal: Maintains hematologic stability  Description: INTERVENTIONS  - Assess for signs and symptoms of bleeding or hemorrhage  - Monitor labs and vital signs for trends  - Administer supportive blood products/factors, fluids and medications as ordered and appropriate  - Administer supportive blood products/factors as ordered and appropriate  8/5/2024 1105 by Sharita Bennett RN  Outcome: Progressing  8/5/2024 1104 by Sharita Bennett RN  Outcome: Progressing  8/5/2024 1104 by Sharita Bennett RN  Outcome: Progressing  Goal: Free from bleeding injury  Description: (Example usage: patient with low platelets)  INTERVENTIONS:  - Avoid intramuscular injections, enemas and rectal medication administration  - Ensure safe mobilization of patient  - Hold pressure on venipuncture sites to achieve adequate hemostasis  - Assess for signs and symptoms of internal bleeding  - Monitor lab trends  - Patient is to report abnormal signs of bleeding to staff  - Avoid use of toothpicks and dental floss  - Use electric shaver for shaving  - Use soft bristle tooth brush  - Limit straining and forceful nose blowing  8/5/2024 1105 by Sharita Bennett RN  Outcome: Progressing  8/5/2024 1104 by Sharita Bennett RN  Outcome: Progressing  8/5/2024 1104 by Sharita Bennett RN  Outcome: Progressing     Problem: PAIN - ADULT  Goal: Verbalizes/displays adequate comfort level or patient's stated pain goal  Description: INTERVENTIONS:  - Encourage pt to monitor pain and request assistance  - Assess pain using appropriate pain scale  - Administer analgesics based on  type and severity of pain and evaluate response  - Implement non-pharmacological measures as appropriate and evaluate response  - Consider cultural and social influences on pain and pain management  - Manage/alleviate anxiety  - Utilize distraction and/or relaxation techniques  - Monitor for opioid side effects  - Notify MD/LIP if interventions unsuccessful or patient reports new pain  - Anticipate increased pain with activity and pre-medicate as appropriate  8/5/2024 1105 by Sharita Bennett, RN  Outcome: Progressing  8/5/2024 1104 by Sharita Bennett, RN  Outcome: Progressing  8/5/2024 1104 by Sharita Bennett, RN  Outcome: Progressing

## 2024-08-06 VITALS
HEART RATE: 79 BPM | SYSTOLIC BLOOD PRESSURE: 132 MMHG | TEMPERATURE: 98 F | DIASTOLIC BLOOD PRESSURE: 74 MMHG | WEIGHT: 211.13 LBS | OXYGEN SATURATION: 95 % | HEIGHT: 70 IN | BODY MASS INDEX: 30.23 KG/M2 | RESPIRATION RATE: 16 BRPM

## 2024-08-06 LAB
ANION GAP SERPL CALC-SCNC: 9 MMOL/L (ref 0–18)
BUN BLD-MCNC: 11 MG/DL (ref 9–23)
BUN/CREAT SERPL: 13.1 (ref 10–20)
CALCIUM BLD-MCNC: 9.4 MG/DL (ref 8.7–10.4)
CHLORIDE SERPL-SCNC: 106 MMOL/L (ref 98–112)
CO2 SERPL-SCNC: 22 MMOL/L (ref 21–32)
CREAT BLD-MCNC: 0.84 MG/DL
DEPRECATED RDW RBC AUTO: 43.8 FL (ref 35.1–46.3)
EGFRCR SERPLBLD CKD-EPI 2021: 91 ML/MIN/1.73M2 (ref 60–?)
ERYTHROCYTE [DISTWIDTH] IN BLOOD BY AUTOMATED COUNT: 13.4 % (ref 11–15)
GLUCOSE BLD-MCNC: 108 MG/DL (ref 70–99)
HCT VFR BLD AUTO: 43.4 %
HGB BLD-MCNC: 15.7 G/DL
MCH RBC QN AUTO: 32.1 PG (ref 26–34)
MCHC RBC AUTO-ENTMCNC: 36.2 G/DL (ref 31–37)
MCV RBC AUTO: 88.8 FL
OSMOLALITY SERPL CALC.SUM OF ELEC: 284 MOSM/KG (ref 275–295)
PLATELET # BLD AUTO: 194 10(3)UL (ref 150–450)
POTASSIUM SERPL-SCNC: 4.1 MMOL/L (ref 3.5–5.1)
RBC # BLD AUTO: 4.89 X10(6)UL
SODIUM SERPL-SCNC: 137 MMOL/L (ref 136–145)
WBC # BLD AUTO: 9.9 X10(3) UL (ref 4–11)

## 2024-08-06 PROCEDURE — 85027 COMPLETE CBC AUTOMATED: CPT | Performed by: INTERNAL MEDICINE

## 2024-08-06 PROCEDURE — 80048 BASIC METABOLIC PNL TOTAL CA: CPT | Performed by: INTERNAL MEDICINE

## 2024-08-06 RX ORDER — ASPIRIN 81 MG/1
81 TABLET, CHEWABLE ORAL DAILY
Qty: 30 TABLET | Refills: 1 | Status: SHIPPED | OUTPATIENT
Start: 2024-08-07

## 2024-08-06 RX ORDER — METOPROLOL SUCCINATE 25 MG/1
25 TABLET, EXTENDED RELEASE ORAL
Qty: 90 TABLET | Refills: 3 | Status: SHIPPED | OUTPATIENT
Start: 2024-08-07

## 2024-08-06 RX ORDER — ATORVASTATIN CALCIUM 20 MG/1
20 TABLET, FILM COATED ORAL NIGHTLY
Qty: 30 TABLET | Refills: 1 | Status: SHIPPED | OUTPATIENT
Start: 2024-08-06

## 2024-08-06 RX ORDER — METOPROLOL SUCCINATE 25 MG/1
25 TABLET, EXTENDED RELEASE ORAL
Status: DISCONTINUED | OUTPATIENT
Start: 2024-08-07 | End: 2024-08-06

## 2024-08-06 NOTE — DISCHARGE SUMMARY
[facility]  part of [facility] Internal Medicine Discharge Summary   Patient ID:  Mariano Soto  [id number]  74 year old  6/25/1950    Admit date: 8/4/2024    Discharge date and time: 8/6/2024  2:49 PM      Attending Physician: No att. providers found     Primary Care Physician: No primary care provider on file.     Reason for admission NSTEMI (see HPI on HP for further detail)    Discharged Condition: stable    Disposition: home    Risk of Readmission Lace+ Score: 45  59-90 High Risk  29-58 Medium Risk  0-28   Low Risk    Important follow up:  -follow up with PCP  -silverio per cards   Discharge meds  I reviewed and reconciled current and discharge medications on the day of discharge with the changes reflected below.       Medication List        START taking these medications      aspirin 81 MG Chew  Chew 1 tablet (81 mg total) by mouth daily.  Start taking on: August 7, 2024     atorvastatin 20 MG Tabs  Commonly known as: Lipitor  Take 1 tablet (20 mg total) by mouth nightly.     metoprolol succinate ER 25 MG Tb24  Commonly known as: Toprol XL  Take 1 tablet (25 mg total) by mouth Daily Beta Blocker.  Start taking on: August 7, 2024     ticagrelor 90 MG Tabs  Commonly known as: Brilinta  Take 1 tablet (90 mg total) by mouth every 12 (twelve) hours.               Where to Get Your Medications        These medications were sent to [facility]/pharmacy #[id number] [facility], [place] - [address] AT [facility], [phone], [phone]  [address] [facility] [address]      Hours: 24-hours Phone: [phone]   aspirin 81 MG Chew  atorvastatin 20 MG Tabs  metoprolol succinate ER 25 MG Tb24  ticagrelor 90 MG Tabs       HPI per chart  Mariano Soto is a 74 year old male with no significant past medical history who presented with upper left chest pain that radiated to the left arm.  Started after he was outside for about 10 minutes and came back indoors and the tightness began.  Subsided by  the time he got into the ER.  Troponin found to be mildly elevated at 70.  Will need to trend     Pt states pain lasted about 20 minutes., now resolved. No CP or SOB. Normally very active. No home meds. No fevers, cough,  NO N/V/D.    Hospital Course  Found to have NSTEMI.  Echo with normal EF but did show some hypokinesis and mild to moderate aortic regurgitation.  Patient to have this monitored with his outpatient doctors.  Chest pain-free after angiogram.  Left main normal.  LAD with diffuse mild disease and 70% ostial to proximal tubular lesion of the first diagonal branch.  RCA with mild discrete lesion in the mid RCA.  Patient with successful PCI of the OM1 due to 99% lesion.  Discharge Diagnoses:   NSTEMI  - Left main normal.  LAD with diffuse mild disease and 70% ostial to proximal tubular lesion of the first diagonal branch.  RCA with mild discrete lesion in the mid RCA.  Patient with successful PCI of the OM1 due to 99% lesion.  -per cards ok for DC. Brilinta coupon prior to dc    HTN  -BB at dc      LDL 82, HDL 40,    -statin      Consults: IP CONSULT TO CARDIOLOGY  IP CONSULT TO CARDIAC REHAB  IP CONSULT TO FOOD AND NUTRITION SERVICES  IP CONSULT TO SOCIAL WORK    Radiology: CATH LEFT HEART CATH W/INTERVENTION    Result Date: 2024  This exam has been completed. Please refer to Notes for the results to this procedure.    CARD ECHO 2D DOPPLER (CPT=93306)    Result Date: 2024  Transthoracic Echocardiogram Name:Mariano Soto Date: 2024 :  1950 Ht:  (70in)  BP: 144 / 78 MRN:  [id number]     Age:  74years    Wt:  (211lb) HR: 62bpm Loc:  [facility]       Gndr: M          BSA: 2.14m^2 Sonographer: Yaquelin RIOJAS Ordering:    Jesus Castillo Consulting:  Reece Pate ---------------------------------------------------------------------------- History/Indications:   NSTEMI. ---------------------------------------------------------------------------- Procedure information:  A transthoracic complete  2D study was performed. Additional evaluation included M-mode, complete spectral Doppler, and color Doppler.  Patient status:  Inpatient.  Location:  Bedside.    This was a routine study. Transthoracic echocardiography for diagnosis. Image quality was adequate. The study was technically limited due to poor acoustic window availability. ECG rhythm:   Normal sinus ---------------------------------------------------------------------------- Conclusions: 1. Left ventricle: The cavity size was normal. Wall thickness was mildly    increased. Systolic function was at the lower limits of normal. The    estimated ejection fraction was 50-55%, by biplane method of disks.    Possible mild hypokinesis of the anterior wall. Left ventricular    diastolic function is indeterminate. 2. Aortic valve: There was mild to moderate regurgitation. 3. Systemic arteries: The aortic root diameter is 4.2cm. 4. Aortic root: The aortic root was dilated. * ---------------------------------------------------------------------------- * Findings: Left ventricle:  The cavity size was normal. Wall thickness was mildly increased. Systolic function was at the lower limits of normal. The estimated ejection fraction was 50-55%, by biplane method of disks. Regional wall motion abnormalities:   Possible mild hypokinesis of the anterior wall. Left ventricular diastolic function is indeterminate. Left atrium:  Well visualized. The atrium was normal in size. Right ventricle:  The cavity size was normal. Systolic function was normal. Right atrium:  Well visualized. The atrium was normal in size. Mitral valve:  Well visualized. The valve was structurally normal. The leaflets were normal thickness. Leaflet separation was normal. No evidence for prolapse.  Doppler:  Transvalvular velocity was within the normal range. There was no evidence for stenosis. There was trace regurgitation. Aortic valve:  Well visualized. The valve was structurally normal. The valve was  trileaflet. The leaflets were normal thickness. Cusp separation was normal.  Doppler:  Transvalvular velocity was within the normal range. There was no evidence for stenosis. There was mild to moderate regurgitation. The peak systolic gradient was 5mm Hg. Tricuspid valve:  Well visualized. The annulus is normal-sized. The valve is structurally normal. The leaflets are normal thickness. Leaflet separation was normal. No echocardiographic evidence for tricuspid prolapse.  Doppler: Transvalvular velocity was within the normal range. There was no evidence for stenosis. There was no significant regurgitation. Pulmonic valve:   Well visualized. The annulus is normal-sized. The valve is structurally normal. The leaflets are normal thickness. Cusp separation was normal. No evidence for prolapse.  Doppler:  Transvalvular velocity was within the normal range. There was no evidence for stenosis. There was mild regurgitation. Pericardium:   There was no pericardial effusion. Pleura:  No evidence of pleural fluid accumulation. Aorta: Aortic root: The aortic root was dilated. Ascending aorta: The ascending aorta was normal. The ascending aorta was normal. Pulmonary arteries: The main pulmonary artery was normal-sized.  Systolic pressure could not be accurately estimated. Systemic veins: Inferior vena cava: The IVC was normally collapsible and normal-sized. ---------------------------------------------------------------------------- Measurements  Left ventricle                    Value        Ref  IVS thickness, ED, PLAX       (H) 1.3   cm     0.6 - 1.0  LV ID, ED, PLAX                   5.0   cm     4.2 - 5.8  LV ID, ES, PLAX                   3.6   cm     2.5 - 4.0  LV PW thickness, ED, PLAX     (H) 1.1   cm     0.6 - 1.0  IVS/LV PW ratio, ED, PLAX         1.18         ---------  LV PW/LV ID ratio, ED, PLAX       0.22         ---------  LV ejection fraction              54    %      52 - 72  Stroke volume/bsa, 2D             32     ml/m^2 ---------  LV end-diastolic volume, 1-p      111   ml     69 - 185  A4C  LV ejection fraction, 1-p A4C     57    %      46 - 74  Stroke volume, 1-p A4C            63    ml     ---------  LV end-diastolic volume/bsa,      52    ml/m^2 37 - 93  1-p A4C  Stroke volume/bsa, 1-p A4C        30    ml/m^2 ---------  LV end-diastolic volume, 2-p      110   ml     62 - 150  LV end-systolic volume, 2-p       47    ml     21 - 61  LV ejection fraction, 2-p         57    %      52 - 72  Stroke volume, 2-p                63    ml     ---------  LV end-diastolic volume/bsa,      52    ml/m^2 34 - 74  2-p  LV end-systolic volume/bsa,       22    ml/m^2 11 - 31  2-p  Stroke volume/bsa, 2-p            29.5  ml/m^2 ---------  LV e', lateral                (L) 6.9   cm/sec >=10.0  LV E/e', lateral                  7            <=13  LV e', medial                     7.3   cm/sec >=7.0  LV E/e', medial                   7            ---------  LV e', average                    7.1   cm/sec ---------  LV E/e', average                  7            <=14  LVOT                              Value        Ref  LVOT ID                           2     cm     ---------  LVOT peak velocity, S             1.02  m/sec  ---------  LVOT VTI, S                       21.7  cm     ---------  LVOT peak gradient, S             4     mm Hg  ---------  LVOT mean gradient, S             2     mm Hg  ---------  Stroke volume (SV), LVOT DP       68    ml     ---------  Stroke index (SV/bsa), LVOT       32    ml/m^2 ---------  DP  Aortic valve                      Value        Ref  Aortic leaflet separation, MM     1.9   cm     ---------  Aortic valve peak velocity, S     1.16  m/sec  ---------  Aortic peak gradient, S           5     mm Hg  ---------  Aortic pressure half-time         533   ms     ---------  Velocity ratio, peak, LVOT/AV     0.88         ---------  Aortic regurg velocity, ED        3.32  m/sec  ---------  Aortic regurg deceleration         174   cm/s^2 ---------  Aortic regurg pressure            557   ms     ---------  half-time  Aortic regurg gradient, ED        44    mm Hg  ---------  Aortic root                       Value        Ref  Aortic root ID                    4.2   cm     2.8 - 4.2  Aortic root ID, STJ, ED           3.4   cm     2.3 - 3.5  Ascending aorta                   Value        Ref  Ascending aorta ID                3.4   cm     2.2 - 3.8  Left atrium                       Value        Ref  LA volume, S                      45    ml     18 - 58  LA volume/bsa, S                  21    ml/m^2 16 - 34  LA volume, ES, 1-p A4C            44    ml     18 - 58  LA volume, ES, 1-p A2C            38    ml     18 - 58  LA volume, ES, A/L                47    ml     ---------  LA volume/bsa, ES, A/L            22    ml/m^2 16 - 34  Mitral valve                      Value        Ref  Mitral E-wave peak velocity       0.51  m/sec  ---------  Mitral A-wave peak velocity       0.62  m/sec  ---------  Mitral deceleration time          222   ms     ---------  Mitral E/A ratio, peak            0.8          ---------  Pulmonary artery                  Value        Ref  PA pressure, ED, DP               7     mm Hg  ---------  Systemic veins                    Value        Ref  Estimated CVP                     3     mm Hg  ---------  Inferior vena cava                Value        Ref  ID                                1.7   cm     <=2.1  Right ventricle                   Value        Ref  TAPSE, 2D                         2.30  cm     >=1.70  TAPSE, MM                         2.30  cm     >=1.70  RV s', lateral                    14.9  cm/sec >=9.5  Pulmonic valve                    Value        Ref  Pulmonic regurg peak velocity     1.67  m/sec  ---------  Pulmonic regurg peak gradient     11    mm Hg  ---------  Pulmonic regurg velocity, ED      1     m/sec  --------- Legend: (L)  and  (H)  elsie values outside specified reference range.  ---------------------------------------------------------------------------- Prepared and electronically signed by Jesus Castillo 08/05/2024 11:26     XR CHEST AP PORTABLE  (CPT=71045)    Result Date: 8/4/2024  PROCEDURE: XR CHEST AP PORTABLE  (CPT=71045) TIME: 16 30  COMPARISON: None.  INDICATIONS: Chest Tightness/ pain today.  TECHNIQUE:   Single view.   Findings and impression:  Normal heart size.  No edema.  Low lung volumes, lungs are clear.  Normal pleura Finalized by (CST): Chucky Ulloa MD on 8/04/2024 at 4:57 PM             Operative Procedures:      Day of discharge feels well, no CP or SOB     Exam  Vitals:    08/06/24 0828   BP: 132/74   Pulse: 79   Resp: 16   Temp: 98 °F (36.7 °C)     No acute distress, alert and orientedx3  Lungs Clear  Heart Regular  Abdomen Benign    Total Time Coordinating Care: > than 30 minutes  Note: This chart was prepared using voice recognition software and may contain unintended word substitution errors.     Patient had opportunity to ask questions and state understand and agree with therapeutic plan as outlined

## 2024-08-06 NOTE — PLAN OF CARE
Pt alert and oriented x4. Pt on room air. Plan to dc home with wife. Primary RN educated pt on dc instructions. Pt has no questions at this time. Pt sent home with brillianta coupon and post cath instructions.  Pt ambulatory per self.  Problem: Patient Centered Care  Goal: Patient preferences are identified and integrated in the patient's plan of care  Description: Interventions:  - What would you like us to know as we care for you? Home with wife  - Provide timely, complete, and accurate information to patient/family  - Incorporate patient and family knowledge, values, beliefs, and cultural backgrounds into the planning and delivery of care  - Encourage patient/family to participate in care and decision-making at the level they choose  - Honor patient and family perspectives and choices  Outcome: Completed     Problem: Patient/Family Goals  Goal: Patient/Family Long Term Goal  Description: Patient's Long Term Goal: go home    Interventions:  - go home  - See additional Care Plan goals for specific interventions  Outcome: Completed  Goal: Patient/Family Short Term Goal  Description: Patient's Short Term Goal: no chest pain    Interventions:   - aspirin  - See additional Care Plan goals for specific interventions  Outcome: Completed     Problem: CARDIOVASCULAR - ADULT  Goal: Maintains optimal cardiac output and hemodynamic stability  Description: INTERVENTIONS:  - Monitor vital signs, rhythm, and trends  - Monitor for bleeding, hypotension and signs of decreased cardiac output  - Evaluate effectiveness of vasoactive medications to optimize hemodynamic stability  - Monitor arterial and/or venous puncture sites for bleeding and/or hematoma  - Assess quality of pulses, skin color and temperature  - Assess for signs of decreased coronary artery perfusion - ex. Angina  - Evaluate fluid balance, assess for edema, trend weights  Outcome: Completed     Problem: HEMATOLOGIC - ADULT  Goal: Maintains hematologic  stability  Description: INTERVENTIONS  - Assess for signs and symptoms of bleeding or hemorrhage  - Monitor labs and vital signs for trends  - Administer supportive blood products/factors, fluids and medications as ordered and appropriate  - Administer supportive blood products/factors as ordered and appropriate  Outcome: Completed  Goal: Free from bleeding injury  Description: (Example usage: patient with low platelets)  INTERVENTIONS:  - Avoid intramuscular injections, enemas and rectal medication administration  - Ensure safe mobilization of patient  - Hold pressure on venipuncture sites to achieve adequate hemostasis  - Assess for signs and symptoms of internal bleeding  - Monitor lab trends  - Patient is to report abnormal signs of bleeding to staff  - Avoid use of toothpicks and dental floss  - Use electric shaver for shaving  - Use soft bristle tooth brush  - Limit straining and forceful nose blowing  Outcome: Completed     Problem: PAIN - ADULT  Goal: Verbalizes/displays adequate comfort level or patient's stated pain goal  Description: INTERVENTIONS:  - Encourage pt to monitor pain and request assistance  - Assess pain using appropriate pain scale  - Administer analgesics based on type and severity of pain and evaluate response  - Implement non-pharmacological measures as appropriate and evaluate response  - Consider cultural and social influences on pain and pain management  - Manage/alleviate anxiety  - Utilize distraction and/or relaxation techniques  - Monitor for opioid side effects  - Notify MD/LIP if interventions unsuccessful or patient reports new pain  - Anticipate increased pain with activity and pre-medicate as appropriate  Outcome: Completed

## 2024-08-06 NOTE — PLAN OF CARE
Patient alert and oriented on room air with no complaints of pain. Cath site checked and recovered per orders. Call light in reach and no needs noted at this time  Problem: Patient Centered Care  Goal: Patient preferences are identified and integrated in the patient's plan of care  Description: Interventions:  - What would you like us to know as we care for you? Home with wife  - Provide timely, complete, and accurate information to patient/family  - Incorporate patient and family knowledge, values, beliefs, and cultural backgrounds into the planning and delivery of care  - Encourage patient/family to participate in care and decision-making at the level they choose  - Honor patient and family perspectives and choices  Outcome: Progressing     Problem: Patient/Family Goals  Goal: Patient/Family Long Term Goal  Description: Patient's Long Term Goal: go home    Interventions:  - go home  - See additional Care Plan goals for specific interventions  Outcome: Progressing  Goal: Patient/Family Short Term Goal  Description: Patient's Short Term Goal: no chest pain    Interventions:   - aspirin  - See additional Care Plan goals for specific interventions  Outcome: Progressing     Problem: CARDIOVASCULAR - ADULT  Goal: Maintains optimal cardiac output and hemodynamic stability  Description: INTERVENTIONS:  - Monitor vital signs, rhythm, and trends  - Monitor for bleeding, hypotension and signs of decreased cardiac output  - Evaluate effectiveness of vasoactive medications to optimize hemodynamic stability  - Monitor arterial and/or venous puncture sites for bleeding and/or hematoma  - Assess quality of pulses, skin color and temperature  - Assess for signs of decreased coronary artery perfusion - ex. Angina  - Evaluate fluid balance, assess for edema, trend weights  Outcome: Progressing     Problem: HEMATOLOGIC - ADULT  Goal: Maintains hematologic stability  Description: INTERVENTIONS  - Assess for signs and symptoms of  bleeding or hemorrhage  - Monitor labs and vital signs for trends  - Administer supportive blood products/factors, fluids and medications as ordered and appropriate  - Administer supportive blood products/factors as ordered and appropriate  Outcome: Progressing  Goal: Free from bleeding injury  Description: (Example usage: patient with low platelets)  INTERVENTIONS:  - Avoid intramuscular injections, enemas and rectal medication administration  - Ensure safe mobilization of patient  - Hold pressure on venipuncture sites to achieve adequate hemostasis  - Assess for signs and symptoms of internal bleeding  - Monitor lab trends  - Patient is to report abnormal signs of bleeding to staff  - Avoid use of toothpicks and dental floss  - Use electric shaver for shaving  - Use soft bristle tooth brush  - Limit straining and forceful nose blowing  Outcome: Progressing     Problem: PAIN - ADULT  Goal: Verbalizes/displays adequate comfort level or patient's stated pain goal  Description: INTERVENTIONS:  - Encourage pt to monitor pain and request assistance  - Assess pain using appropriate pain scale  - Administer analgesics based on type and severity of pain and evaluate response  - Implement non-pharmacological measures as appropriate and evaluate response  - Consider cultural and social influences on pain and pain management  - Manage/alleviate anxiety  - Utilize distraction and/or relaxation techniques  - Monitor for opioid side effects  - Notify MD/LIP if interventions unsuccessful or patient reports new pain  - Anticipate increased pain with activity and pre-medicate as appropriate  Outcome: Progressing

## 2024-08-06 NOTE — DIETARY NOTE
NUTRITION EDUCATION NOTE     Received consult for cardiac nutrition education. Verbally reviewed basic cardiac diet restrictions. Provided with Eating Heart-Healthy and NCM handout to reinforce. Receptive to instruction. Would benefit from outpt f/u. Expect fair compliance. RD contact information provided to pt.        Marsha Gallardo RD, LDN  Clinical Dietitian  P: [phone]

## 2024-08-06 NOTE — PAYOR COMM NOTE
--------------  ADMISSION REVIEW     Payor: [employer]  Subscriber #:  [id number]  Authorization Number: [id number]    Admit date: 8/4/24  Admit time:  6:39 PM         History   HPI  74-year-old male presents for evaluation for chest pain.  Patient reports that he was outside and developed chest tightness that radiated around his chest with shortness of breath that lasted for about 30 minutes.  He states that it was moderate in intensity.  Has never had symptoms like this.  He is currently asymptomatic.  He denies any nausea, vomiting, diaphoresis, syncope.  He denies any focal neurologic symptoms.  He denies any history of smoking.  There is no family history of ACS.  ED Triage Vitals [08/04/24 1538]   /74   Pulse 76   Resp 18   Temp 97.7 °F (36.5 °C)   Temp src Oral   SpO2 100 %   O2 Device None (Room air)     HENT:      Head: Normocephalic and atraumatic.   Cardiovascular:      Rate and Rhythm: Normal rate and regular rhythm.      Pulses: Normal pulses.           Radial pulses are 2+ on the right side and 2+ on the left side.      Heart sounds: Normal heart sounds. No murmur heard.  Pulmonary:      Effort: Pulmonary effort is normal.      Breath sounds: Normal breath sounds and air entry.   Abdominal:      General: Bowel sounds are normal. There is no abdominal bruit.      Palpations: Abdomen is soft.      Tenderness: There is no abdominal tenderness.   Musculoskeletal:         General: Normal range of motion.      Cervical back: Normal range of motion.      Right lower leg: No edema.      Left lower leg: No edema.   Skin:     General: Skin is warm and dry.   Neurological:      General: No focal deficit present.      Mental Status: He is alert.     Labs Reviewed   COMP METABOLIC PANEL (14) - Abnormal; Notable for the following components:       Result Value    Glucose 145 (*)     Calculated Osmolality 296 (*)     A/G Ratio 2.1 (*)     All other components within normal limits   TROPONIN I  HIGH SENSITIVITY - Abnormal; Notable for the following components:    Troponin I (High Sensitivity) 70 (*)     All other components within normal limits   LIPID PANEL - Abnormal; Notable for the following components:    Triglycerides 261 (*)     VLDL 42 (*)     All other components within normal limits   HEMOGLOBIN A1C - Abnormal; Notable for the following components:    HgbA1C 5.8 (*)    Admission disposition: 8/4/2024  5:17 PM    Disposition and Plan   Clinical Impression:  1. NSTEMI (non-ST elevated myocardial infarction) (HCC)       Disposition:  Admit  8/4/2024  5:17 pm      H&P      HPI   Mariano Soto is a 74 year old male with no significant past medical history who presented with upper left chest pain that radiated to the left arm.  Started after he was outside for about 10 minutes and came back indoors and the tightness began.  Subsided by the time he got into the ER.  Troponin found to be mildly elevated at 70.  Will need to trend     Pt states pain lasted about 20 minutes., now resolved. No CP or SOB. Normally very active. No home meds. No fevers, cough,  NO N/V/D.    Assessment and Plan      Mariano Soto is a 74 year old male with no significant past medical history who presented with upper left chest pain that radiated to the left arm.  Started after he was outside for about 10 minutes and came back indoors and the tightness began.  Subsided by the time he got into the ER.  Troponin found to be mildly elevated at 70.  Will need to trend     NSTEMI?   Chest pain  -trop elevated to 70 approx 45 minutes after onset  -repeat pending  -cards consult, defer hep gtt decision to cards based on repeat trop  -NPO at [facility], defer to cards angio vs stress test  -aspirin     HTN  -bp elevated, pt denies hx, no home meds  -monitor trend, start med if continues to be elevated     LDL 82, HDL 40,   -start statin     FEN: IVF PRN, lytes in AM, npo midngiht     Ppx; scd, hsq         8/5/24  Subjective   Cath  later today       Lab 08/04/24  1610 08/04/24  1943 08/05/24  0651   WBC 6.5 6.8 8.2   HGB 15.6 16.1 15.9   MCV 90.1 87.1 88.0   .0 194.0 190.0  190.0      Lab 08/04/24  1610 08/04/24  1943 08/05/24  0651    139 139   K 4.1 4.2 4.4    108 109   CO2 23.0 26.0 24.0   BUN 12 12 11   CREATSERUM 0.90 0.98 0.91   CA 9.8 9.7 9.4   * 172* 119*      Lab 08/04/24  1610 08/05/24  0651   ALT 17 17   AST 20 19   ALB 4.5 4.3       Assessment/Plan:   Mariano Soto is a 74 year old male with no significant past medical history who presented with upper left chest pain that radiated to the left arm.  Started after he was outside for about 10 minutes and came back indoors and the tightness began.  Subsided by the time he got into the ER.  Troponin found to be mildly elevated at 70.  Will need to trend     NSTEMI  Chest pain  -trop elevated to 70 approx 45 minutes after onset->300  -hep gtt. Cards consult. Angio  -follow up results  -cont statin, aspirin     HTN  -bp elevated, pt denies hx, no home meds  -monitor trend, start med if continues to be elevated     LDL 82, HDL 40,   -start statin     FEN: IVF PRN, lytes in AM, npo midnight     Ppx; scd, hsq       CARDIOLOGY  ASSESSMENT/PLAN:      IMPRESSIONS:  Acute NSTEMI  hyperglycemia      PLAN:  ECGs reviewed  IV heparin  ASA, BB, statin      MEDICATIONS ADMINISTERED IN LAST 1 DAY:  aspirin chewable tab 81 mg       Date Action Dose Route User    8/6/2024 0832 Given 81 mg Oral Ullrich, Emily          atorvastatin (Lipitor) tab 20 mg       Date Action Dose Route User    8/5/2024 2014 Given 20 mg Oral Anju, Ailyn     metoprolol tartrate (Lopressor) partial tab 12.5 mg       Date Action Dose Route User    8/6/2024 0541 Given 12.5 mg Oral Anju, Ailyn    8/5/2024 1750 Given 12.5 mg Oral Sharita Bennett, RN          ondansetron (Zofran) 4 MG/2ML injection 4 mg       Date Action Dose Route User    8/5/2024 1922 Given 4 mg Intravenous Sharita Bennett, RN           sodium chloride 0.9% infusion       Date Action Dose Route User    8/5/2024 1745 Restarted (none) Intravenous Sharita Bennett, RN          ticagrelor (Brilinta) tab 90 mg       Date Action Dose Route User    8/6/2024 0541 Given 90 mg Oral Ailyn Rene            Vitals (last day)       Date/Time Temp Pulse Resp BP SpO2 Weight O2 Device O2 Flow Rate (L/min) [facility]    08/06/24 0828 98 °F (36.7 °C) 79 16 132/74 95 % -- None (Room air) -- EU    08/05/24 2012 97.6 °F (36.4 °C) 65 18 132/60 94 % -- None (Room air) -- EC    08/05/24 0758 97.9 °F (36.6 °C) 76 18 144/78 94 % -- None (Room air) -- SY    08/05/24 0402 -- -- -- -- -- 211 lb 4.8 oz (95.8 kg) -- -- GG    08/05/24 0222 98.4 °F (36.9 °C) 67 16 131/72 94 % -- None (Room air) -- ML

## 2024-08-06 NOTE — PROGRESS NOTES
[facility] CARDIOLOGY Progress Note      Mariano Soto is a 74 year old male who I assessed as follows:  Chief Complaint   Patient presents with    Chest Pain          REASON FOR CONSULTATION:    chest pain            ASSESSMENT/PLAN:     IMPRESSIONS:  Acute NSTEMI. CAD s/p LACIE OM1 8/5/24  hyperglycemia        PLAN:  DAPT. Importance discussed  ASA, BB (changed to succinate form), statin  Home today          SUBJECTIVE:      No chest pain or dyspnea        --------------------------------------------------------------------------------------------------------------------------------    HPI:         Was outside yesterday afternoon and developed left chest pain across chest in a band. Still persisted after 1/2 hour so he came to ER. Resolved prior to ER. Mild dyspnea. Not related to exertion. Not pleuritic. Troponins elevated. No prior heart issues. Elevated glucose.             Current Outpatient Medications   Medication Sig Dispense Refill    [START ON 8/7/2024] aspirin 81 MG Oral Chew Tab Chew 1 tablet (81 mg total) by mouth daily. 30 tablet 1    atorvastatin 20 MG Oral Tab Take 1 tablet (20 mg total) by mouth nightly. 30 tablet 1    metoprolol tartrate 25 MG Oral Tab Take 0.5 tablets (12.5 mg total) by mouth 2x Daily(Beta Blocker). 30 tablet 1    ticagrelor 90 MG Oral Tab Take 1 tablet (90 mg total) by mouth every 12 (twelve) hours. 60 tablet 11      History reviewed. No pertinent past medical history.  History reviewed. No pertinent surgical history.  No family history on file.   Social History:  Social History     Socioeconomic History    Marital status:    Tobacco Use    Smoking status: Never    Smokeless tobacco: Never   Substance and Sexual Activity    Alcohol use: Never    Drug use: Never     Social Determinants of Health     Food Insecurity: Unknown (8/4/2024)    Food Insecurity     Food Insecurity: Patient declined   Transportation Needs: Unknown (8/4/2024)    Transportation Needs     Lack  of Transportation: Patient declined   Housing Stability: Unknown (2024)    Housing Stability     Housing Instability: Patient declined          Medications:  Current Facility-Administered Medications   Medication Dose Route Frequency    metoprolol tartrate (Lopressor) partial tab 12.5 mg  12.5 mg Oral 2x Daily(Beta Blocker)    ticagrelor (Brilinta) tab 90 mg  90 mg Oral Q12H    acetaminophen (Tylenol Extra Strength) tab 500 mg  500 mg Oral Q4H PRN    polyethylene glycol (PEG 3350) (Miralax) 17 g oral packet 17 g  17 g Oral Daily PRN    sennosides (Senokot) tab 17.2 mg  17.2 mg Oral Nightly PRN    bisacodyl (Dulcolax) 10 MG rectal suppository 10 mg  10 mg Rectal Daily PRN    ondansetron (Zofran) 4 MG/2ML injection 4 mg  4 mg Intravenous Q6H PRN    metoclopramide (Reglan) 5 mg/mL injection 10 mg  10 mg Intravenous Q8H PRN    atorvastatin (Lipitor) tab 20 mg  20 mg Oral Nightly    aspirin chewable tab 81 mg  81 mg Oral Daily    nitroglycerin (Nitrostat) SL tab 0.4 mg  0.4 mg Sublingual Once           Allergies  No Known Allergies                  EXAM:          TELE: SR          /74 (BP Location: Right arm)   Pulse 79   Temp 98 °F (36.7 °C) (Oral)   Resp 16   Ht 5' 10\" (1.778 m)   Wt 211 lb 1.6 oz (95.8 kg)   SpO2 95%   BMI 30.29 kg/m²   Temp (24hrs), Av.8 °F (36.6 °C), Min:97.6 °F (36.4 °C), Max:98 °F (36.7 °C)    Wt Readings from Last 3 Encounters:   24 211 lb 1.6 oz (95.8 kg)         Intake/Output Summary (Last 24 hours) at 2024 1345  Last data filed at 2024 0538  Gross per 24 hour   Intake 312.5 ml   Output --   Net 312.5 ml         GENERAL: well developed, well nourished, in no apparent distress  SKIN: no rashes  HEENT: atraumatic, normocephalic, throat without erythema  NECK: supple, no bruits  LUNGS: clear to auscultation  CARDIO: RRR without murmur or S3   GI: soft, nontender  EXTREMITIES: no cyanosis, clubbing or edema. Right wrist without hematoma. Excellent radial pulse.  Right groin without hematoma  NEUROLOGY: alert  PSYCH: cooperative          LABORATORY DATA:               ECG: SR, LAD        ECHO:          Labs:  Recent Labs   Lab 08/04/24  1943 08/05/24  0651 08/06/24  0621   * 119* 108*   BUN 12 11 11   CREATSERUM 0.98 0.91 0.84   CA 9.7 9.4 9.4    139 137   K 4.2 4.4 4.1    109 106   CO2 26.0 24.0 22.0     No results for input(s): \"TROP\" in the last 168 hours.  Recent Labs   Lab 08/05/24  0651 08/06/24  0621   RBC 4.99 4.89   HGB 15.9 15.7   HCT 43.9 43.4   MCV 88.0 88.8   MCH 31.9 32.1   MCHC 36.2 36.2   RDW 13.5 13.4   NEPRELIM 5.62  --    WBC 8.2 9.9   .0  190.0 194.0     Recent Labs   Lab 08/04/24  1610 08/04/24  1808 08/05/24  0651   TROPHS 70* 186* 337*       Imaging:  No results found.       CXR:  Findings and impression:  Normal heart size.  No edema.  Low lung volumes, lungs are clear.  Normal pleura         CATH 8/5/24:  ANGIOGRAPHY:       Left main: Large vessel that bifurcates into the LAD and circumflex coronary arteries.  Angiographically normal.     LAD: Large vessel that wraps the apex and gives off a large diagonal branch.  There is diffuse mild disease.  There is 70% ostial to proximal tubular lesion of the first diagonal branch.     Left circumflex: Large, tortuous artery with an acute 90 degree turn at the ostium and gives off a large OM1 branch. There is a discrete 99% lesion at the bifurcation of the obtuse marginal.     RCA: Large-caliber vessel that gives off the RPDA.  There is a mild discrete lesion of the mid RCA.     CONCLUSIONS:  1.  Successful PCI of the OM1 with a Synergy XD 2.25 x 12 mm LACIE postdilated to 2.5 mm with conversion of the stenosis from 99% to 0%.  SANGITA-3 flow pre and postintervention.     RECOMMENDATIONS:  DAPT with aspirin and ticagrelor for 12 months for ACS indication, and then aspirin 81 mg indefinitely thereafter.  IV hydration for 4 hours.  Can discontinue heparin.  Bedrest for 2  harvey.            Jesus Castillo MD

## 2024-08-06 NOTE — CM/SW NOTE
Received MDO for Brilinta coverage. VITA confirmed Rx sent via e-script to pt's pharmacy.     VITA/ASAD contacted pt's [facility] via phone #: [phone] and had to leave a Vmail for call back.    Requested Pharamcist run price check and call SW at phone # provided.    CHANTELLE Frias and DC CHANTELLE Castano updated.    Pending call back from pharmacy w/ any OOP cost for pt.    UPDATE: Received call back from [facility] via alaTest - pt's OOP is $242/month. Likely due to unmet deductible. CHANTELLE Frias also received call w/ price.    VITA placed Free 30 Day Trial coupon on pt's chart.      MS MollyW, LSW [id number]

## 2024-08-13 ENCOUNTER — ORDER TRANSCRIPTION (OUTPATIENT)
Dept: CARDIAC REHAB | Facility: HOSPITAL | Age: 74
End: 2024-08-13

## 2024-08-13 DIAGNOSIS — Z98.61 S/P PTCA (PERCUTANEOUS TRANSLUMINAL CORONARY ANGIOPLASTY): Primary | ICD-10-CM

## 2024-08-15 ENCOUNTER — CARDPULM VISIT (OUTPATIENT)
Dept: CARDIAC REHAB | Facility: HOSPITAL | Age: 74
End: 2024-08-15
Attending: INTERNAL MEDICINE
Payer: MEDICARE

## 2024-08-19 ENCOUNTER — CARDPULM VISIT (OUTPATIENT)
Dept: CARDIAC REHAB | Facility: HOSPITAL | Age: 74
End: 2024-08-19
Attending: INTERNAL MEDICINE
Payer: MEDICARE

## 2024-08-19 PROCEDURE — 93798 PHYS/QHP OP CAR RHAB W/ECG: CPT

## 2024-08-21 ENCOUNTER — CARDPULM VISIT (OUTPATIENT)
Dept: CARDIAC REHAB | Facility: HOSPITAL | Age: 74
End: 2024-08-21
Attending: INTERNAL MEDICINE
Payer: MEDICARE

## 2024-08-21 PROCEDURE — 93798 PHYS/QHP OP CAR RHAB W/ECG: CPT

## 2024-08-23 ENCOUNTER — CARDPULM VISIT (OUTPATIENT)
Dept: CARDIAC REHAB | Facility: HOSPITAL | Age: 74
End: 2024-08-23
Attending: INTERNAL MEDICINE
Payer: MEDICARE

## 2024-08-23 PROCEDURE — 93798 PHYS/QHP OP CAR RHAB W/ECG: CPT

## 2024-08-26 ENCOUNTER — CARDPULM VISIT (OUTPATIENT)
Dept: CARDIAC REHAB | Facility: HOSPITAL | Age: 74
End: 2024-08-26
Attending: INTERNAL MEDICINE
Payer: MEDICARE

## 2024-08-26 PROCEDURE — 93798 PHYS/QHP OP CAR RHAB W/ECG: CPT

## 2024-08-28 ENCOUNTER — CARDPULM VISIT (OUTPATIENT)
Dept: CARDIAC REHAB | Facility: HOSPITAL | Age: 74
End: 2024-08-28
Attending: INTERNAL MEDICINE
Payer: MEDICARE

## 2024-08-28 PROCEDURE — 93798 PHYS/QHP OP CAR RHAB W/ECG: CPT

## 2024-08-30 ENCOUNTER — CARDPULM VISIT (OUTPATIENT)
Dept: CARDIAC REHAB | Facility: HOSPITAL | Age: 74
End: 2024-08-30
Attending: INTERNAL MEDICINE
Payer: MEDICARE

## 2024-08-30 PROCEDURE — 93798 PHYS/QHP OP CAR RHAB W/ECG: CPT

## 2024-09-04 ENCOUNTER — CARDPULM VISIT (OUTPATIENT)
Dept: CARDIAC REHAB | Facility: HOSPITAL | Age: 74
End: 2024-09-04
Attending: INTERNAL MEDICINE
Payer: MEDICARE

## 2024-09-04 PROCEDURE — 93798 PHYS/QHP OP CAR RHAB W/ECG: CPT

## 2024-09-06 ENCOUNTER — CARDPULM VISIT (OUTPATIENT)
Dept: CARDIAC REHAB | Facility: HOSPITAL | Age: 74
End: 2024-09-06
Attending: INTERNAL MEDICINE
Payer: MEDICARE

## 2024-09-06 PROCEDURE — 93798 PHYS/QHP OP CAR RHAB W/ECG: CPT

## 2024-09-09 ENCOUNTER — CARDPULM VISIT (OUTPATIENT)
Dept: CARDIAC REHAB | Facility: HOSPITAL | Age: 74
End: 2024-09-09
Attending: INTERNAL MEDICINE
Payer: MEDICARE

## 2024-09-09 PROCEDURE — 93798 PHYS/QHP OP CAR RHAB W/ECG: CPT

## 2024-09-11 ENCOUNTER — CARDPULM VISIT (OUTPATIENT)
Dept: CARDIAC REHAB | Facility: HOSPITAL | Age: 74
End: 2024-09-11
Attending: INTERNAL MEDICINE
Payer: MEDICARE

## 2024-09-11 PROCEDURE — 93798 PHYS/QHP OP CAR RHAB W/ECG: CPT

## 2024-09-13 ENCOUNTER — CARDPULM VISIT (OUTPATIENT)
Dept: CARDIAC REHAB | Facility: HOSPITAL | Age: 74
End: 2024-09-13
Attending: INTERNAL MEDICINE
Payer: MEDICARE

## 2024-09-13 PROCEDURE — 93798 PHYS/QHP OP CAR RHAB W/ECG: CPT

## 2024-09-16 ENCOUNTER — CARDPULM VISIT (OUTPATIENT)
Dept: CARDIAC REHAB | Facility: HOSPITAL | Age: 74
End: 2024-09-16
Attending: INTERNAL MEDICINE
Payer: MEDICARE

## 2024-09-16 PROCEDURE — 93798 PHYS/QHP OP CAR RHAB W/ECG: CPT

## 2024-09-18 ENCOUNTER — CARDPULM VISIT (OUTPATIENT)
Dept: CARDIAC REHAB | Facility: HOSPITAL | Age: 74
End: 2024-09-18
Attending: INTERNAL MEDICINE
Payer: MEDICARE

## 2024-09-18 PROCEDURE — 93798 PHYS/QHP OP CAR RHAB W/ECG: CPT

## 2024-09-20 ENCOUNTER — CARDPULM VISIT (OUTPATIENT)
Dept: CARDIAC REHAB | Facility: HOSPITAL | Age: 74
End: 2024-09-20
Attending: INTERNAL MEDICINE
Payer: MEDICARE

## 2024-09-20 PROCEDURE — 93798 PHYS/QHP OP CAR RHAB W/ECG: CPT

## 2024-09-23 ENCOUNTER — CARDPULM VISIT (OUTPATIENT)
Dept: CARDIAC REHAB | Facility: HOSPITAL | Age: 74
End: 2024-09-23
Attending: INTERNAL MEDICINE
Payer: MEDICARE

## 2024-09-23 PROCEDURE — 93798 PHYS/QHP OP CAR RHAB W/ECG: CPT

## 2024-09-25 ENCOUNTER — CARDPULM VISIT (OUTPATIENT)
Dept: CARDIAC REHAB | Facility: HOSPITAL | Age: 74
End: 2024-09-25
Attending: INTERNAL MEDICINE
Payer: MEDICARE

## 2024-09-25 PROCEDURE — 93798 PHYS/QHP OP CAR RHAB W/ECG: CPT

## 2024-09-27 ENCOUNTER — CARDPULM VISIT (OUTPATIENT)
Dept: CARDIAC REHAB | Facility: HOSPITAL | Age: 74
End: 2024-09-27
Attending: INTERNAL MEDICINE
Payer: MEDICARE

## 2024-09-27 PROCEDURE — 93798 PHYS/QHP OP CAR RHAB W/ECG: CPT

## 2024-10-02 ENCOUNTER — APPOINTMENT (OUTPATIENT)
Dept: CARDIAC REHAB | Facility: HOSPITAL | Age: 74
End: 2024-10-02
Attending: INTERNAL MEDICINE
Payer: MEDICARE

## 2024-10-02 PROCEDURE — 93798 PHYS/QHP OP CAR RHAB W/ECG: CPT

## 2024-10-04 ENCOUNTER — CARDPULM VISIT (OUTPATIENT)
Dept: CARDIAC REHAB | Facility: HOSPITAL | Age: 74
End: 2024-10-04
Attending: INTERNAL MEDICINE
Payer: MEDICARE

## 2024-10-04 PROCEDURE — 93798 PHYS/QHP OP CAR RHAB W/ECG: CPT

## 2024-10-07 ENCOUNTER — CARDPULM VISIT (OUTPATIENT)
Dept: CARDIAC REHAB | Facility: HOSPITAL | Age: 74
End: 2024-10-07
Attending: INTERNAL MEDICINE
Payer: MEDICARE

## 2024-10-07 PROCEDURE — 93798 PHYS/QHP OP CAR RHAB W/ECG: CPT

## 2024-10-09 ENCOUNTER — CARDPULM VISIT (OUTPATIENT)
Dept: CARDIAC REHAB | Facility: HOSPITAL | Age: 74
End: 2024-10-09
Attending: INTERNAL MEDICINE
Payer: MEDICARE

## 2024-10-09 PROCEDURE — 93798 PHYS/QHP OP CAR RHAB W/ECG: CPT

## 2024-10-11 ENCOUNTER — CARDPULM VISIT (OUTPATIENT)
Dept: CARDIAC REHAB | Facility: HOSPITAL | Age: 74
End: 2024-10-11
Attending: INTERNAL MEDICINE
Payer: MEDICARE

## 2024-10-11 PROCEDURE — 93798 PHYS/QHP OP CAR RHAB W/ECG: CPT

## 2024-10-14 ENCOUNTER — CARDPULM VISIT (OUTPATIENT)
Dept: CARDIAC REHAB | Facility: HOSPITAL | Age: 74
End: 2024-10-14
Attending: INTERNAL MEDICINE
Payer: MEDICARE

## 2024-10-14 PROCEDURE — 93798 PHYS/QHP OP CAR RHAB W/ECG: CPT

## 2024-10-16 ENCOUNTER — CARDPULM VISIT (OUTPATIENT)
Dept: CARDIAC REHAB | Facility: HOSPITAL | Age: 74
End: 2024-10-16
Attending: INTERNAL MEDICINE
Payer: MEDICARE

## 2024-10-16 PROCEDURE — 93798 PHYS/QHP OP CAR RHAB W/ECG: CPT

## 2024-10-18 ENCOUNTER — CARDPULM VISIT (OUTPATIENT)
Dept: CARDIAC REHAB | Facility: HOSPITAL | Age: 74
End: 2024-10-18
Attending: INTERNAL MEDICINE
Payer: MEDICARE

## 2024-10-18 PROCEDURE — 93798 PHYS/QHP OP CAR RHAB W/ECG: CPT

## 2024-10-21 ENCOUNTER — CARDPULM VISIT (OUTPATIENT)
Dept: CARDIAC REHAB | Facility: HOSPITAL | Age: 74
End: 2024-10-21
Attending: INTERNAL MEDICINE
Payer: MEDICARE

## 2024-10-21 PROCEDURE — 93798 PHYS/QHP OP CAR RHAB W/ECG: CPT

## 2024-10-23 ENCOUNTER — CARDPULM VISIT (OUTPATIENT)
Dept: CARDIAC REHAB | Facility: HOSPITAL | Age: 74
End: 2024-10-23
Attending: INTERNAL MEDICINE
Payer: MEDICARE

## 2024-10-23 PROCEDURE — 93798 PHYS/QHP OP CAR RHAB W/ECG: CPT

## 2024-10-25 ENCOUNTER — CARDPULM VISIT (OUTPATIENT)
Dept: CARDIAC REHAB | Facility: HOSPITAL | Age: 74
End: 2024-10-25
Attending: INTERNAL MEDICINE
Payer: MEDICARE

## 2024-10-25 PROCEDURE — 93798 PHYS/QHP OP CAR RHAB W/ECG: CPT

## 2024-10-28 ENCOUNTER — CARDPULM VISIT (OUTPATIENT)
Dept: CARDIAC REHAB | Facility: HOSPITAL | Age: 74
End: 2024-10-28
Attending: INTERNAL MEDICINE
Payer: MEDICARE

## 2024-10-28 PROCEDURE — 93798 PHYS/QHP OP CAR RHAB W/ECG: CPT

## 2024-10-30 ENCOUNTER — CARDPULM VISIT (OUTPATIENT)
Dept: CARDIAC REHAB | Facility: HOSPITAL | Age: 74
End: 2024-10-30
Attending: INTERNAL MEDICINE
Payer: MEDICARE

## 2024-10-30 PROCEDURE — 93798 PHYS/QHP OP CAR RHAB W/ECG: CPT

## 2024-11-01 ENCOUNTER — CARDPULM VISIT (OUTPATIENT)
Dept: CARDIAC REHAB | Facility: HOSPITAL | Age: 74
End: 2024-11-01
Attending: INTERNAL MEDICINE
Payer: MEDICARE

## 2024-11-01 PROCEDURE — 93798 PHYS/QHP OP CAR RHAB W/ECG: CPT

## 2024-11-04 ENCOUNTER — CARDPULM VISIT (OUTPATIENT)
Dept: CARDIAC REHAB | Facility: HOSPITAL | Age: 74
End: 2024-11-04
Attending: INTERNAL MEDICINE
Payer: MEDICARE

## 2024-11-04 PROCEDURE — 93798 PHYS/QHP OP CAR RHAB W/ECG: CPT

## 2024-11-06 ENCOUNTER — CARDPULM VISIT (OUTPATIENT)
Dept: CARDIAC REHAB | Facility: HOSPITAL | Age: 74
End: 2024-11-06
Attending: INTERNAL MEDICINE
Payer: MEDICARE

## 2024-11-06 PROCEDURE — 93798 PHYS/QHP OP CAR RHAB W/ECG: CPT

## 2024-11-08 ENCOUNTER — CARDPULM VISIT (OUTPATIENT)
Dept: CARDIAC REHAB | Facility: HOSPITAL | Age: 74
End: 2024-11-08
Attending: INTERNAL MEDICINE
Payer: MEDICARE

## 2024-11-08 PROCEDURE — 93798 PHYS/QHP OP CAR RHAB W/ECG: CPT

## 2024-11-11 ENCOUNTER — CARDPULM VISIT (OUTPATIENT)
Dept: CARDIAC REHAB | Facility: HOSPITAL | Age: 74
End: 2024-11-11
Attending: INTERNAL MEDICINE
Payer: MEDICARE

## 2024-11-11 PROCEDURE — 93798 PHYS/QHP OP CAR RHAB W/ECG: CPT

## 2024-11-13 ENCOUNTER — CARDPULM VISIT (OUTPATIENT)
Dept: CARDIAC REHAB | Facility: HOSPITAL | Age: 74
End: 2024-11-13
Attending: INTERNAL MEDICINE
Payer: MEDICARE

## 2024-11-13 PROCEDURE — 93798 PHYS/QHP OP CAR RHAB W/ECG: CPT

## 2024-11-15 ENCOUNTER — APPOINTMENT (OUTPATIENT)
Dept: CARDIAC REHAB | Facility: HOSPITAL | Age: 74
End: 2024-11-15
Attending: INTERNAL MEDICINE
Payer: MEDICARE

## 2024-11-18 ENCOUNTER — APPOINTMENT (OUTPATIENT)
Dept: CARDIAC REHAB | Facility: HOSPITAL | Age: 74
End: 2024-11-18
Attending: INTERNAL MEDICINE
Payer: MEDICARE

## 2024-11-20 ENCOUNTER — APPOINTMENT (OUTPATIENT)
Dept: CARDIAC REHAB | Facility: HOSPITAL | Age: 74
End: 2024-11-20
Attending: INTERNAL MEDICINE
Payer: MEDICARE

## 2024-11-22 ENCOUNTER — APPOINTMENT (OUTPATIENT)
Dept: CARDIAC REHAB | Facility: HOSPITAL | Age: 74
End: 2024-11-22
Attending: INTERNAL MEDICINE
Payer: MEDICARE

## 2024-11-25 ENCOUNTER — APPOINTMENT (OUTPATIENT)
Dept: CARDIAC REHAB | Facility: HOSPITAL | Age: 74
End: 2024-11-25
Attending: INTERNAL MEDICINE
Payer: MEDICARE

## 2024-11-27 ENCOUNTER — APPOINTMENT (OUTPATIENT)
Dept: CARDIAC REHAB | Facility: HOSPITAL | Age: 74
End: 2024-11-27
Attending: INTERNAL MEDICINE
Payer: MEDICARE

## 2024-11-29 ENCOUNTER — APPOINTMENT (OUTPATIENT)
Dept: CARDIAC REHAB | Facility: HOSPITAL | Age: 74
End: 2024-11-29
Attending: INTERNAL MEDICINE
Payer: MEDICARE

## (undated) NOTE — LETTER
[place] [address]  Authorization for Invasive Procedures  Date: 8/5/24           Time: 0800    I hereby authorize Dr. Barrios, my physician and his/her assistants (if applicable), which may include medical students, residents, and/or fellows, to perform the following surgical operation/ procedure and administer such anesthesia as may be determined necessary by my physician:  Operation/Procedure name (s)  Cardiac Catheterization, Left Ventricular Cineangiography, Bilateral Selective Coronary Angiography and/or Right Heart Catheterization; possible Percutaneous Transluminal Coronary Angioplasty, Coronary Atherectomy, Coronary Stent, Intracoronary Thrombolytic therapy, Antiplatelet therapy and/or Intravascular Ultrasound on Mariano Soto   2.   I recognize that during the surgical operation/procedure, unforeseen conditions may necessitate additional or different procedures than those listed above.  I, therefore, further authorize and request that the above-named surgeon, assistants, or designees perform such procedures as are, in their judgment, necessary and desirable.    3.   My surgeon/physician has discussed prior to my surgery the potential benefits, risks and side effects of this procedure; the likelihood of achieving goals; and potential problems that might occur during recuperation.  They also discussed reasonable alternatives to the procedure, including risks, benefits, and side effects related to the alternatives and risks related to not receiving this procedure.  I have had all my questions answered and I acknowledge that no guarantee has been made as to the result that may be obtained.    4.   Should the need arise during my operation/procedure, which includes change of level of care prior to discharge, I also consent to the administration of blood and/or blood products.  Further, I understand that despite careful testing and screening of blood or blood products by collecting  agencies, I may still be subject to ill effects as a result of receiving a blood transfusion and/or blood products.  The following are some, but not all, of the potential risks that can occur: fever and allergic reactions, hemolytic reactions, transmission of diseases such as Hepatitis, AIDS and Cytomegalovirus (CMV) and fluid overload.  In the event that I wish to have an autologous transfusion of my own blood, or a directed donor transfusion, I will discuss this with my physician.  Check only if Refusing Blood or Blood Products  I understand refusal of blood or blood products as deemed necessary by my physician may have serious consequences to my condition to include possible death. I hereby assume responsibility for my refusal and release the hospital, its personnel, and my physicians from any responsibility for the consequences of my refusal.          o  Refuse      5.   I authorize the use of any specimen, organs, tissues, body parts or foreign objects that may be removed from my body during the operation/procedure for diagnosis, research or teaching purposes and their subsequent disposal by hospital authorities.  I also authorize the release of specimen test results and/or written reports to my treating physician on the hospital medical staff or other referring or consulting physicians involved in my care, at the discretion of the Pathologist or my treating physician.    6.   I consent to the photographing or videotaping of the operations or procedures to be performed, including appropriate portions of my body for medical, scientific, or educational purposes, provided my identity is not revealed by the pictures or by descriptive texts accompanying them.  If the procedure has been photographed/videotaped, the surgeon will obtain the original picture, image, videotape or CD.  The hospital will not be responsible for storage, release or maintenance of the picture, image, tape or CD.    7.   I consent to the  presence of a  or observers in the operating room as deemed necessary by my physician or their designees.    8.   I recognize that in the event my procedure results in extended X-Ray/fluoroscopy time, I may develop a skin reaction.    9. If I have a Do Not Attempt Resuscitation (DNAR) order in place, that status will be suspended while in the operating room, procedural suite, and during the recovery period unless otherwise explicitly stated by me (or a person authorized to consent on my behalf). The surgeon or my attending physician will determine when the applicable recovery period ends for purposes of reinstating the DNAR order.  10. Patients having a sterilization procedure: I understand that if the procedure is successful the results will be permanent and it will therefore be impossible for me to inseminate, conceive, or bear children.  I also understand that the procedure is intended to result in sterility, although the result has not been guaranteed.   11. I acknowledge that my physician has explained sedation/analgesia administration to me including the risk and benefits I consent to the administration of sedation/analgesia as may be necessary or desirable in the judgment of my physician.    I CERTIFY THAT I HAVE READ AND FULLY UNDERSTAND THE ABOVE CONSENT TO OPERATION and/or OTHER PROCEDURE.        ____________________________________       _________________________________      ______________________________  Signature of Patient         Signature of Responsible Person        Printed Name of Responsible Person    ____________________________________      _________________________________      ______________________________       Signature of Witness          Relationship to Patient                       Date                                       Time  Patient Name: Mariano CHIDIGuillermina Soto  : 1950    Reviewed: 2024   Printed: 2024  Medical Record #: [id number] Page 1 of 2            STATEMENT OF PHYSICIAN My signature below affirms that prior to the time of the procedure; I have explained to the patient and/or his/her legal representative, the risks and benefits involved in the proposed treatment and any reasonable alternative to the proposed treatment. I have also explained the risks and benefits involved in refusal of the proposed treatment and alternatives to the proposed treatment and have answered the patient's questions. If I have a significant financial interest in a co-management agreement or a significant financial interest in any product or implant, or other significant relationship used in this procedure/surgery, I have disclosed this and had a discussion with my patient.     _______________________________________________________________ _____________________________  (Signature of Physician)                                                                                         (Date)                                   (Time)  Patient Name: Mariano Soto  : 1950    Reviewed: 2024   Printed: 2024  Medical Record #: [id number] Page 2 of 2